# Patient Record
Sex: FEMALE | Race: WHITE | Employment: OTHER | URBAN - METROPOLITAN AREA
[De-identification: names, ages, dates, MRNs, and addresses within clinical notes are randomized per-mention and may not be internally consistent; named-entity substitution may affect disease eponyms.]

---

## 2017-03-23 ENCOUNTER — HOSPITAL ENCOUNTER (OUTPATIENT)
Dept: ONCOLOGY | Age: 67
Discharge: HOME | End: 2017-03-23

## 2017-10-27 RX ORDER — CEPHALEXIN 500 MG/1
500 CAPSULE ORAL 4 TIMES DAILY
COMMUNITY
End: 2018-06-11 | Stop reason: ALTCHOICE

## 2017-10-27 RX ORDER — LEVALBUTEROL TARTRATE 45 UG/1
1-2 AEROSOL, METERED ORAL EVERY 4 HOURS PRN
COMMUNITY

## 2017-10-27 RX ORDER — QUINIDINE GLUCONATE 324 MG
240 TABLET, EXTENDED RELEASE ORAL
COMMUNITY
End: 2017-12-13 | Stop reason: ALTCHOICE

## 2017-10-27 RX ORDER — FAMOTIDINE 20 MG/1
40 TABLET, FILM COATED ORAL 2 TIMES DAILY
COMMUNITY

## 2017-10-27 RX ORDER — KETOCONAZOLE 20 MG/G
CREAM TOPICAL DAILY
COMMUNITY

## 2017-10-27 RX ORDER — HYDROXYZINE HYDROCHLORIDE 25 MG/1
25 TABLET, FILM COATED ORAL 3 TIMES DAILY PRN
COMMUNITY
End: 2017-12-13 | Stop reason: ALTCHOICE

## 2017-10-27 RX ORDER — DOXYCYCLINE HYCLATE 100 MG
100 TABLET ORAL 2 TIMES DAILY
COMMUNITY

## 2017-10-27 RX ORDER — NYSTATIN 100000 U/G
CREAM TOPICAL 2 TIMES DAILY
COMMUNITY

## 2017-10-27 RX ORDER — MEROPENEM 1 G/1
1 INJECTION, POWDER, FOR SOLUTION INTRAVENOUS EVERY 8 HOURS
COMMUNITY
End: 2017-12-13 | Stop reason: ALTCHOICE

## 2017-11-15 ENCOUNTER — OFFICE VISIT (OUTPATIENT)
Dept: INFECTIOUS DISEASES | Age: 67
End: 2017-11-15
Payer: MEDICARE

## 2017-11-15 VITALS
HEART RATE: 84 BPM | TEMPERATURE: 97.1 F | SYSTOLIC BLOOD PRESSURE: 168 MMHG | WEIGHT: 293 LBS | DIASTOLIC BLOOD PRESSURE: 79 MMHG | BODY MASS INDEX: 45.99 KG/M2 | HEIGHT: 67 IN

## 2017-11-15 DIAGNOSIS — N39.0 E. COLI UTI: Primary | ICD-10-CM

## 2017-11-15 DIAGNOSIS — A31.8 MYCOBACTERIUM FORTUITUM INFECTION: ICD-10-CM

## 2017-11-15 DIAGNOSIS — A31.0 MYCOBACTERIAL DISEASE, PULMONARY (HCC): ICD-10-CM

## 2017-11-15 DIAGNOSIS — B96.20 E. COLI UTI: Primary | ICD-10-CM

## 2017-11-15 PROCEDURE — G8427 DOCREV CUR MEDS BY ELIG CLIN: HCPCS | Performed by: INTERNAL MEDICINE

## 2017-11-15 PROCEDURE — G8400 PT W/DXA NO RESULTS DOC: HCPCS | Performed by: INTERNAL MEDICINE

## 2017-11-15 PROCEDURE — 99214 OFFICE O/P EST MOD 30 MIN: CPT | Performed by: INTERNAL MEDICINE

## 2017-11-15 PROCEDURE — G8417 CALC BMI ABV UP PARAM F/U: HCPCS | Performed by: INTERNAL MEDICINE

## 2017-11-15 PROCEDURE — 4040F PNEUMOC VAC/ADMIN/RCVD: CPT | Performed by: INTERNAL MEDICINE

## 2017-11-15 PROCEDURE — G8484 FLU IMMUNIZE NO ADMIN: HCPCS | Performed by: INTERNAL MEDICINE

## 2017-11-15 PROCEDURE — 3014F SCREEN MAMMO DOC REV: CPT | Performed by: INTERNAL MEDICINE

## 2017-11-15 PROCEDURE — 1036F TOBACCO NON-USER: CPT | Performed by: INTERNAL MEDICINE

## 2017-11-15 PROCEDURE — 1123F ACP DISCUSS/DSCN MKR DOCD: CPT | Performed by: INTERNAL MEDICINE

## 2017-11-15 PROCEDURE — 1090F PRES/ABSN URINE INCON ASSESS: CPT | Performed by: INTERNAL MEDICINE

## 2017-11-15 PROCEDURE — 3017F COLORECTAL CA SCREEN DOC REV: CPT | Performed by: INTERNAL MEDICINE

## 2017-11-15 RX ORDER — CEFTRIAXONE 1 G/1
1 INJECTION, POWDER, FOR SOLUTION INTRAMUSCULAR; INTRAVENOUS EVERY 24 HOURS
Qty: 7 G | Refills: 0
Start: 2017-11-15 | End: 2017-11-22

## 2017-11-15 ASSESSMENT — ENCOUNTER SYMPTOMS
GASTROINTESTINAL NEGATIVE: 1
EYES NEGATIVE: 1
RESPIRATORY NEGATIVE: 1
ALLERGIC/IMMUNOLOGIC NEGATIVE: 1

## 2017-11-15 NOTE — LETTER
[facility] [address] [place] [address].  [address] [facility],5Th [address] [address]  Phone: [phone]  Fax: [phone]    PCP: Wesley Samano MD   CC providers:  No Recipients    November 15, 2017       Patient: Jhonny Santana   MR Number: [id number]   YOB: 1950   Date of Visit: 11/15/2017     Dear Williams Henry: Thank you for allowing me to see your patient Ms. Jhonny Santana for evaluation of UTI. Below are the relevant portions of my assessment and plan of care. No notes on file    If you have questions, please do not hesitate to call me. I look forward to following [place] along with you.     Sincerely,        Zaina Westfall MD

## 2017-11-15 NOTE — PROGRESS NOTES
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 2.  E. coli sensitive to ceftriaxone    Recommendations   · As explained to the patient, her symptoms are atypical, but because of persistence and lack of explanation of her frequency, we will treat her as a cystitis with ceftriaxone IM for 5-7 days. IV option in the infusion center is also acceptable. According to coverage. · She is due to have a CT scan of the chest early December . Follow-up on improvement of her chest lesions. Clinically she improved drastically Her pulmonary symptoms. · We will see after that CT scan is done. ·   1. E. coli UTI  cefTRIAXone (ROCEPHIN) 1 g injection   2. Mycobacterium fortuitum infection  cefTRIAXone (ROCEPHIN) 1 g injection   3. Mycobacterial disease, pulmonary (HCC)  cefTRIAXone (ROCEPHIN) 1 g injection       Return in about 1 month (around 12/15/2017), or with CT results - chest.      History of Present Illness:   Snehal Matos is a 79y.o.-year-old  female who presents with   Chief Complaint   Patient presents with    Follow-up     UTI and mycobacteria of the lungs   This is a lady who I follow for a Mycobacterium fortuitum lung infection. Has had bilateral lung disease with nodules up to 1.8 centimeter. Treated with meropenem about four-month finishing and off August, and doxycycline since April 2017, with complete intolerance to multiple other antibiotics that have been started through that course of therapy. She had symptoms of shortness of breath with asthma-like exacerbations and all the symptoms turned down and improved after her course of therapy.   She at this time remains on doxycycline by mouth and is due to have a CT scan 01/21/2012     01/03/2012    LYMPHOPCT 20 10/19/2016    LYMPHOPCT 18 11/13/2014    MONOPCT 8 10/19/2016    MONOPCT 4 11/13/2014     BMP:   Lab Results   Component Value Date     10/20/2016     10/19/2016    K 4.1 10/20/2016    K 4.4 10/19/2016     10/20/2016     10/19/2016    CO2 21 10/20/2016    CO2 25 10/19/2016    BUN 11 10/20/2016    BUN 15 10/19/2016    CREATININE 0.79 10/20/2016    CREATININE 0.94 10/19/2016    MG 2.0 10/20/2016    MG 1.9 01/21/2012     Hepatic Function Panel:   Lab Results   Component Value Date    PROT 7.4 06/04/2015    ALT 18 10/20/2016    AST 18 10/20/2016     No results found for: RPR  No results found for: HIV  No results found for: [facility]  Lab Results   Component Value Date    MUCUS NOT REPORTED 10/19/2016    RBC 3.93 10/20/2016    RBC 3.15 01/21/2012    TRICHOMONAS NOT REPORTED 10/19/2016    WBC 6.3 10/20/2016    YEAST NOT REPORTED 10/19/2016    TURBIDITY CLEAR 10/19/2016     Lab Results   Component Value Date    CREATININE 0.79 10/20/2016    GLUCOSE 106 10/20/2016    GLUCOSE 117 01/21/2012     Thank you for allowing us to participate in the care of this patient. Please call with questions. Guanaco Michaels MD  - Office: [phone]    Please note that this chart was generated using voice recognition Dragon dictation software. Although every effort was made to ensure the accuracy of this automated transcription, some errors in transcription may have occurred.

## 2017-11-17 ENCOUNTER — HOSPITAL ENCOUNTER (OUTPATIENT)
Dept: ONCOLOGY | Age: 67
Discharge: HOME | End: 2017-11-17

## 2017-11-18 PROBLEM — A31.0 MYCOBACTERIAL DISEASE, PULMONARY (HCC): Status: ACTIVE | Noted: 2017-11-18

## 2017-11-18 PROBLEM — N39.0 E. COLI UTI: Status: ACTIVE | Noted: 2017-11-18

## 2017-11-18 PROBLEM — B96.20 E. COLI UTI: Status: ACTIVE | Noted: 2017-11-18

## 2017-11-18 PROBLEM — A31.8 MYCOBACTERIUM FORTUITUM INFECTION: Status: ACTIVE | Noted: 2017-11-18

## 2017-11-19 ENCOUNTER — HOSPITAL ENCOUNTER (OUTPATIENT)
Dept: ONCOLOGY | Age: 67
Discharge: HOME | End: 2017-11-19
Attending: INTERNAL MEDICINE | Admitting: INTERNAL MEDICINE
Payer: MEDICARE

## 2017-11-19 VITALS
HEART RATE: 82 BPM | DIASTOLIC BLOOD PRESSURE: 80 MMHG | OXYGEN SATURATION: 99 % | SYSTOLIC BLOOD PRESSURE: 169 MMHG | RESPIRATION RATE: 16 BRPM | TEMPERATURE: 97.7 F

## 2017-11-19 DIAGNOSIS — N39.0 E. COLI UTI: ICD-10-CM

## 2017-11-19 DIAGNOSIS — A31.0 MYCOBACTERIAL DISEASE, PULMONARY (HCC): ICD-10-CM

## 2017-11-19 DIAGNOSIS — N30.90 CYSTITIS: ICD-10-CM

## 2017-11-19 DIAGNOSIS — A31.8 MYCOBACTERIUM FORTUITUM INFECTION: ICD-10-CM

## 2017-11-19 DIAGNOSIS — B96.20 E. COLI UTI: ICD-10-CM

## 2017-11-19 PROCEDURE — 2500000003 HC RX 250 WO HCPCS: Performed by: STUDENT IN AN ORGANIZED HEALTH CARE EDUCATION/TRAINING PROGRAM

## 2017-11-19 PROCEDURE — 6360000002 HC RX W HCPCS: Performed by: STUDENT IN AN ORGANIZED HEALTH CARE EDUCATION/TRAINING PROGRAM

## 2017-11-19 PROCEDURE — 96372 THER/PROPH/DIAG INJ SC/IM: CPT

## 2017-11-19 RX ORDER — CEFTRIAXONE 1 G/1
1 INJECTION, POWDER, FOR SOLUTION INTRAMUSCULAR; INTRAVENOUS ONCE
Status: CANCELLED
Start: 2017-11-20 | End: 2017-11-20

## 2017-11-19 RX ORDER — CEFTRIAXONE 1 G/1
1 INJECTION, POWDER, FOR SOLUTION INTRAMUSCULAR; INTRAVENOUS ONCE
Status: DISCONTINUED | OUTPATIENT
Start: 2017-11-19 | End: 2017-11-19

## 2017-11-19 RX ADMIN — CEFTRIAXONE SODIUM 1 G: 1 INJECTION, POWDER, FOR SOLUTION INTRAMUSCULAR; INTRAVENOUS at 15:28

## 2017-11-23 PROBLEM — A31.9 MYCOBACTERIUM INFECTION: Status: ACTIVE | Noted: 2017-11-23

## 2017-12-05 ENCOUNTER — TELEPHONE (OUTPATIENT)
Dept: INFECTIOUS DISEASES | Age: 67
End: 2017-12-05

## 2017-12-06 ENCOUNTER — TELEPHONE (OUTPATIENT)
Dept: INFECTIOUS DISEASES | Age: 67
End: 2017-12-06

## 2017-12-06 DIAGNOSIS — B99.9 INFECTION: Primary | ICD-10-CM

## 2017-12-13 ENCOUNTER — OFFICE VISIT (OUTPATIENT)
Dept: INFECTIOUS DISEASES | Age: 67
End: 2017-12-13
Payer: MEDICARE

## 2017-12-13 VITALS
SYSTOLIC BLOOD PRESSURE: 175 MMHG | WEIGHT: 293 LBS | HEIGHT: 67 IN | BODY MASS INDEX: 45.99 KG/M2 | TEMPERATURE: 97.1 F | OXYGEN SATURATION: 99 % | DIASTOLIC BLOOD PRESSURE: 79 MMHG | HEART RATE: 76 BPM | RESPIRATION RATE: 14 BRPM

## 2017-12-13 DIAGNOSIS — A31.8 MYCOBACTERIUM FORTUITUM INFECTION: ICD-10-CM

## 2017-12-13 DIAGNOSIS — N30.00 ACUTE CYSTITIS WITHOUT HEMATURIA: ICD-10-CM

## 2017-12-13 DIAGNOSIS — A31.0 PULMONARY MYCOBACTERIAL INFECTION (HCC): Primary | ICD-10-CM

## 2017-12-13 PROCEDURE — 4040F PNEUMOC VAC/ADMIN/RCVD: CPT | Performed by: INTERNAL MEDICINE

## 2017-12-13 PROCEDURE — 1036F TOBACCO NON-USER: CPT | Performed by: INTERNAL MEDICINE

## 2017-12-13 PROCEDURE — G8427 DOCREV CUR MEDS BY ELIG CLIN: HCPCS | Performed by: INTERNAL MEDICINE

## 2017-12-13 PROCEDURE — 99214 OFFICE O/P EST MOD 30 MIN: CPT | Performed by: INTERNAL MEDICINE

## 2017-12-13 PROCEDURE — G8417 CALC BMI ABV UP PARAM F/U: HCPCS | Performed by: INTERNAL MEDICINE

## 2017-12-13 PROCEDURE — 3017F COLORECTAL CA SCREEN DOC REV: CPT | Performed by: INTERNAL MEDICINE

## 2017-12-13 PROCEDURE — G8400 PT W/DXA NO RESULTS DOC: HCPCS | Performed by: INTERNAL MEDICINE

## 2017-12-13 PROCEDURE — G8484 FLU IMMUNIZE NO ADMIN: HCPCS | Performed by: INTERNAL MEDICINE

## 2017-12-13 PROCEDURE — 3014F SCREEN MAMMO DOC REV: CPT | Performed by: INTERNAL MEDICINE

## 2017-12-13 PROCEDURE — 1090F PRES/ABSN URINE INCON ASSESS: CPT | Performed by: INTERNAL MEDICINE

## 2017-12-13 PROCEDURE — 1123F ACP DISCUSS/DSCN MKR DOCD: CPT | Performed by: INTERNAL MEDICINE

## 2017-12-13 RX ORDER — LEVALBUTEROL INHALATION SOLUTION 0.31 MG/3ML
1 SOLUTION RESPIRATORY (INHALATION) EVERY 4 HOURS PRN
COMMUNITY

## 2017-12-13 RX ORDER — DOXYCYCLINE HYCLATE 100 MG
100 TABLET ORAL 2 TIMES DAILY
Qty: 60 TABLET | Refills: 1 | Status: SHIPPED | OUTPATIENT
Start: 2017-12-13 | End: 2018-03-06 | Stop reason: SDUPTHER

## 2017-12-13 RX ORDER — AZELASTINE 1 MG/ML
1 SPRAY, METERED NASAL 2 TIMES DAILY
COMMUNITY

## 2017-12-13 NOTE — PATIENT INSTRUCTIONS
Patient is scheduled for CT scan at [facility] on 6/9/18 at 1:30pm. Order faxed to [phone].

## 2017-12-13 NOTE — PROGRESS NOTES
[facility] - Initial Consult Note  Today's Date and Time: 12/13/2017, 4:33 PM    Impression :   ·     Recommendations   ·   1. Pulmonary mycobacterial infection ([address]     2. Mycobacterium fortuitum infection         No Follow-up on file. History of Present Illness:   Riaz Sanders is a 79y.o.-year-old {Race/ethnicity:[id number]} female who presents with   Chief Complaint   Patient presents with    Other     mycobacteria fortuitum       I have personally reviewed the past medical history, past surgical history, medications, social history, and family history, and I have updated the database accordingly. 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    Congenital malformation of bladder and urethra     Fracture 2001    2 vertebrate in mid back    Hypertension     Macular pucker     Sensitivity to sunlight     Extreme    Sjogren's syndrome (HCC)     Sjogren's syndrome ([facility] 2015    SVT [facility]supra[facility]ricular tachycardia) ([facility])     Vertigo     Vertigo     Whiplash 1998    WPW (Castro-Parkinson-White syndrome)        Past Surgical  History:     Past Surgical History:   Procedure Laterality Date    ABLATION OF DYSRHYTHMIC FOCUS      CHOLECYSTECTOMY      HYSTERECTOMY      JOINT REPLACEMENT      SINUS SURGERY      TONSILLECTOMY      TOTAL KNEE ARTHROPLASTY Bilateral     TUBAL LIGATION      URETHRAL STRICTURE DILATATION      and repair       Medications:     Current Outpatient Prescriptions:     levalbuterol (XOPENEX) 0.31 MG/3ML nebulization, Take 1 ampule by nebulization every 4 hours as needed for Wheezing, Disp: , Rfl:     azelastine (ASTELIN) 0.1 % nasal spray, 1 spray by Nasal route 2 times daily Use in each nostril as directed, Disp: , Rfl:     ketoconazole (NIZORAL) 2 % cream, Apply topically daily Apply topically daily. , Disp: , Rfl:    Flonase [fluticasone propionate]; Prednisone; and Reglan [metoclopramide]     Review of Systems:   Review of Systems    Physical Examination :   Blood pressure (!) 175/79, pulse 76, temperature 97.1 °F (36.2 °C), resp. rate 14, height 5' 7\" (1.702 m), weight (!) 358 lb 12.8 oz (162.8 kg), SpO2 99 %. Physical Exam      Medical Decision Making:   I have independently reviewed/ordered the following labs:    CBC with Differential:   Lab Results   Component Value Date    WBC 6.3 10/20/2016    WBC 7.0 10/19/2016    HGB 10.3 10/20/2016    HGB 10.6 10/19/2016    HCT 32.1 10/20/2016    HCT 33.8 10/19/2016     10/20/2016     10/19/2016     01/21/2012     01/03/2012    LYMPHOPCT 20 10/19/2016    LYMPHOPCT 18 11/13/2014    MONOPCT 8 10/19/2016    MONOPCT 4 11/13/2014     BMP:   Lab Results   Component Value Date     10/20/2016     10/19/2016    K 4.1 10/20/2016    K 4.4 10/19/2016     10/20/2016     10/19/2016    CO2 21 10/20/2016    CO2 25 10/19/2016    BUN 11 10/20/2016    BUN 15 10/19/2016    CREATININE 0.79 10/20/2016    CREATININE 0.94 10/19/2016    MG 2.0 10/20/2016    MG 1.9 01/21/2012     Hepatic Function Panel:   Lab Results   Component Value Date    PROT 7.4 06/04/2015    ALT 18 10/20/2016    AST 18 10/20/2016     No results found for: RPR  No results found for: HIV  No results found for: [facility]  Lab Results   Component Value Date    MUCUS NOT REPORTED 10/19/2016    RBC 3.93 10/20/2016    RBC 3.15 01/21/2012    TRICHOMONAS NOT REPORTED 10/19/2016    WBC 6.3 10/20/2016    YEAST NOT REPORTED 10/19/2016    TURBIDITY CLEAR 10/19/2016     Lab Results   Component Value Date    CREATININE 0.79 10/20/2016    GLUCOSE 106 10/20/2016    GLUCOSE 117 01/21/2012     Thank you for allowing us to participate in the care of this patient. Please call with questions.       Tejas Burk MD  - Office: [phone]    Please note that this chart was generated using voice recognition

## 2017-12-13 NOTE — LETTER
16 mm right pretracheal area. This slowly decreased 30 mm on noncontrast CT of April 2017, 12 mm June 2017 . CT chest with contrast, and 8 mm on the current CT, noncontrast, without abnormally enlarged lymph nodes and a decrease in number of the lymph nodes in general.  The patient is pretty happy with results. Continue to be on the doxycycline at this time. I have personally reviewed the past medical history, past surgical history, medications, social history, and family history, and I have updated the database accordingly. 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    Congenital malformation of bladder and urethra     Fracture 2001    2 vertebrate in mid back    Hypertension     Macular pucker     Sensitivity to sunlight     Extreme    Sjogren's syndrome (HCC)     Sjogren's syndrome ([address] 2015    SVT (supraventricular tachycardia) (HCC)     Vertigo     Vertigo     Whiplash 1998    WPW (Castro-Parkinson-White syndrome)        Past Surgical  History:     Past Surgical History:   Procedure Laterality Date    ABLATION OF DYSRHYTHMIC FOCUS      CHOLECYSTECTOMY      HYSTERECTOMY      JOINT REPLACEMENT      SINUS SURGERY      TONSILLECTOMY      TOTAL KNEE ARTHROPLASTY Bilateral     TUBAL LIGATION      URETHRAL STRICTURE DILATATION      and repair       Medications:     Current Outpatient Prescriptions:     levalbuterol (XOPENEX) 0.31 MG/3ML nebulization, Take 1 ampule by nebulization every 4 hours as needed for Wheezing, Disp: , Rfl:     azelastine (ASTELIN) 0.1 % nasal spray, 1 spray by Nasal route 2 times daily Use in each nostril as directed, Disp: , Rfl:     doxycycline hyclate (VIBRA-TABS) 100 MG tablet, Take 1 tablet by mouth 2 times daily, Disp: 60 tablet, Rfl: 1    ketoconazole (NIZORAL) 2 % cream, Apply topically daily Apply topically daily. , Disp: , Rfl:

## 2017-12-20 ASSESSMENT — ENCOUNTER SYMPTOMS
EYES NEGATIVE: 1
ALLERGIC/IMMUNOLOGIC NEGATIVE: 1
GASTROINTESTINAL NEGATIVE: 1
RESPIRATORY NEGATIVE: 1

## 2017-12-21 NOTE — PROGRESS NOTES
difficult because of scarring, but the patient continued afterwards having a frequency that continues until today. A urine culture showed Klebsiella and E. coli, and repeat cultures showed E. coli alone. Klebsiella was sensitive to Cipro, but E. coli was sensitive to Macrobid as only oral therapy. The E. coli is also sensitive to ceftriaxone. The patient failed to improve after 2 courses of Macrobid and on short course of Cipro to which she developed tendinitis. The count of E. coli was 10-50,000, and she was referred by the PA to see me for opinion in antibiotic need an choice. The patient has no fever or chills, no flank pain. She has burning upon urination, but it seems to be mostly from the skin itself that is indicated. She has been diagnosed with bacterial vaginosis recently by her primary doctor, and he is on day 2 of Flagyl. The indication is felt to be related to the discharge from the bacterial vaginosis. Visit of 12/13/17  She had a course of Rocephin, her UTI symptoms resolved completely. In the meantime, she had a repeat CT scan of the chest December 2017, which I compared to prior CTs and discuss it over the phone with the radiologist in [facility].  She started in December 2017 with a 17 mm in the hilar area along with a 16 mm right pretracheal area. This slowly decreased 30 mm on noncontrast CT of April 2017, 12 mm June 2017 . CT chest with contrast, and 8 mm on the current CT, noncontrast, without abnormally enlarged lymph nodes and a decrease in number of the lymph nodes in general.  The patient is pretty happy with results. Continue to be on the doxycycline at this time. I have personally reviewed the past medical history, past surgical history, medications, social history, and family history, and I have updated the database accordingly.   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    Congenital malformation of bladder and urethra     Fracture 2001    2 vertebrate in mid back    Hypertension     Macular pucker     Sensitivity to sunlight     Extreme    Sjogren's syndrome (HCC)     Sjogren's syndrome ([address] 2015    SVT (supraventricular tachycardia) (HCC)     Vertigo     Vertigo     Whiplash 1998    WPW (Castro-Parkinson-White syndrome)        Past Surgical  History:     Past Surgical History:   Procedure Laterality Date    ABLATION OF DYSRHYTHMIC FOCUS      CHOLECYSTECTOMY      HYSTERECTOMY      JOINT REPLACEMENT      SINUS SURGERY      TONSILLECTOMY      TOTAL KNEE ARTHROPLASTY Bilateral     TUBAL LIGATION      URETHRAL STRICTURE DILATATION      and repair       Medications:     Current Outpatient Prescriptions:     levalbuterol (Billie Dirk) 0.31 MG/3ML nebulization, Take 1 ampule by nebulization every 4 hours as needed for Wheezing, Disp: , Rfl:     azelastine (ASTELIN) 0.1 % nasal spray, 1 spray by Nasal route 2 times daily Use in each nostril as directed, Disp: , Rfl:     doxycycline hyclate (VIBRA-TABS) 100 MG tablet, Take 1 tablet by mouth 2 times daily, Disp: 60 tablet, Rfl: 1    ketoconazole (NIZORAL) 2 % cream, Apply topically daily Apply topically daily. , Disp: , Rfl:     verapamil (CALAN SR) 180 MG extended release tablet, Take 180 mg by mouth nightly, Disp: , Rfl:     doxycycline hyclate (VIBRA-TABS) 100 MG tablet, Take 100 mg by mouth 2 times daily, Disp: , Rfl:     cephALEXin (KEFLEX) 500 MG capsule, Take 500 mg by mouth 4 times daily, Disp: , Rfl:     mupirocin (BACTROBAN) 2 % ointment, Apply topically 3 times daily Apply topically 3 times daily. , Disp: , Rfl:     nystatin (MYCOSTATIN) 839195 UNIT/GM cream, Apply topically 2 times daily Apply topically 2 times daily. , Disp: , Rfl:     famotidine (PEPCID) 20 MG tablet, Take 20 mg by mouth 2 times daily, Disp: , Rfl:     levalbuterol (XOPENEX HFA) 45 MCG/ACT inhaler, Inhale 1-2 puffs

## 2018-03-06 DIAGNOSIS — A31.8 MYCOBACTERIUM FORTUITUM INFECTION: ICD-10-CM

## 2018-03-06 DIAGNOSIS — A31.0 PULMONARY MYCOBACTERIAL INFECTION (HCC): ICD-10-CM

## 2018-03-07 RX ORDER — DOXYCYCLINE HYCLATE 100 MG
TABLET ORAL
Qty: 60 TABLET | Refills: 0 | Status: SHIPPED | OUTPATIENT
Start: 2018-03-07 | End: 2018-04-15 | Stop reason: SDUPTHER

## 2018-04-15 DIAGNOSIS — A31.0 PULMONARY MYCOBACTERIAL INFECTION (HCC): ICD-10-CM

## 2018-04-15 DIAGNOSIS — A31.8 MYCOBACTERIUM FORTUITUM INFECTION: ICD-10-CM

## 2018-04-17 RX ORDER — DOXYCYCLINE HYCLATE 100 MG
TABLET ORAL
Qty: 60 TABLET | Refills: 0 | Status: SHIPPED | OUTPATIENT
Start: 2018-04-17 | End: 2018-05-18 | Stop reason: SDUPTHER

## 2018-05-18 DIAGNOSIS — A31.8 MYCOBACTERIUM FORTUITUM INFECTION: ICD-10-CM

## 2018-05-18 DIAGNOSIS — A31.0 PULMONARY MYCOBACTERIAL INFECTION (HCC): ICD-10-CM

## 2018-05-18 RX ORDER — DOXYCYCLINE HYCLATE 100 MG
TABLET ORAL
Qty: 60 TABLET | Refills: 1 | Status: SHIPPED | OUTPATIENT
Start: 2018-05-18

## 2018-06-11 ENCOUNTER — OFFICE VISIT (OUTPATIENT)
Dept: INFECTIOUS DISEASES | Age: 68
End: 2018-06-11
Payer: MEDICARE

## 2018-06-11 VITALS
TEMPERATURE: 97.7 F | SYSTOLIC BLOOD PRESSURE: 142 MMHG | HEART RATE: 85 BPM | BODY MASS INDEX: 45.99 KG/M2 | HEIGHT: 67 IN | DIASTOLIC BLOOD PRESSURE: 81 MMHG | WEIGHT: 293 LBS

## 2018-06-11 DIAGNOSIS — A31.0 MYCOBACTERIAL DISEASE, PULMONARY (HCC): Primary | ICD-10-CM

## 2018-06-11 PROCEDURE — G8400 PT W/DXA NO RESULTS DOC: HCPCS | Performed by: INTERNAL MEDICINE

## 2018-06-11 PROCEDURE — 1036F TOBACCO NON-USER: CPT | Performed by: INTERNAL MEDICINE

## 2018-06-11 PROCEDURE — G8417 CALC BMI ABV UP PARAM F/U: HCPCS | Performed by: INTERNAL MEDICINE

## 2018-06-11 PROCEDURE — 3017F COLORECTAL CA SCREEN DOC REV: CPT | Performed by: INTERNAL MEDICINE

## 2018-06-11 PROCEDURE — 1123F ACP DISCUSS/DSCN MKR DOCD: CPT | Performed by: INTERNAL MEDICINE

## 2018-06-11 PROCEDURE — 1090F PRES/ABSN URINE INCON ASSESS: CPT | Performed by: INTERNAL MEDICINE

## 2018-06-11 PROCEDURE — 4040F PNEUMOC VAC/ADMIN/RCVD: CPT | Performed by: INTERNAL MEDICINE

## 2018-06-11 PROCEDURE — G8427 DOCREV CUR MEDS BY ELIG CLIN: HCPCS | Performed by: INTERNAL MEDICINE

## 2018-06-11 PROCEDURE — 99214 OFFICE O/P EST MOD 30 MIN: CPT | Performed by: INTERNAL MEDICINE

## 2018-06-11 RX ORDER — FLUCONAZOLE 100 MG/1
150 TABLET ORAL DAILY
Qty: 3 TABLET | Refills: 1 | Status: SHIPPED | OUTPATIENT
Start: 2018-06-11 | End: 2018-06-13

## 2018-06-11 RX ORDER — DOXYCYCLINE HYCLATE 100 MG
100 TABLET ORAL 2 TIMES DAILY
Qty: 60 TABLET | Refills: 1 | Status: SHIPPED | OUTPATIENT
Start: 2018-06-11 | End: 2018-07-11

## 2018-06-11 ASSESSMENT — ENCOUNTER SYMPTOMS
RESPIRATORY NEGATIVE: 1
ALLERGIC/IMMUNOLOGIC NEGATIVE: 1
EYES NEGATIVE: 1
GASTROINTESTINAL NEGATIVE: 1

## 2018-06-29 ASSESSMENT — ENCOUNTER SYMPTOMS
ABDOMINAL PAIN: 0
COUGH: 0
SHORTNESS OF BREATH: 0
DIARRHEA: 0

## 2018-07-10 ENCOUNTER — HOSPITAL ENCOUNTER (OUTPATIENT)
Age: 68
Setting detail: OUTPATIENT SURGERY
Discharge: HOME | End: 2018-07-10
Attending: INTERNAL MEDICINE | Admitting: INTERNAL MEDICINE
Payer: MEDICARE

## 2018-07-10 ENCOUNTER — ANESTHESIA EVENT (OUTPATIENT)
Dept: ENDOSCOPY | Age: 68
End: 2018-07-10
Payer: MEDICARE

## 2018-07-10 ENCOUNTER — ANESTHESIA (OUTPATIENT)
Dept: ENDOSCOPY | Age: 68
End: 2018-07-10
Payer: MEDICARE

## 2018-07-10 VITALS
OXYGEN SATURATION: 98 % | WEIGHT: 293 LBS | HEIGHT: 67 IN | RESPIRATION RATE: 15 BRPM | SYSTOLIC BLOOD PRESSURE: 119 MMHG | BODY MASS INDEX: 45.99 KG/M2 | DIASTOLIC BLOOD PRESSURE: 65 MMHG | HEART RATE: 75 BPM | TEMPERATURE: 97.8 F

## 2018-07-10 VITALS
DIASTOLIC BLOOD PRESSURE: 90 MMHG | SYSTOLIC BLOOD PRESSURE: 140 MMHG | OXYGEN SATURATION: 96 % | RESPIRATION RATE: 22 BRPM

## 2018-07-10 PROCEDURE — 2580000003 HC RX 258: Performed by: INTERNAL MEDICINE

## 2018-07-10 PROCEDURE — 2580000003 HC RX 258: Performed by: NURSE ANESTHETIST, CERTIFIED REGISTERED

## 2018-07-10 PROCEDURE — 7100000011 HC PHASE II RECOVERY - ADDTL 15 MIN: Performed by: INTERNAL MEDICINE

## 2018-07-10 PROCEDURE — 3609012400 HC EGD TRANSORAL BIOPSY SINGLE/MULTIPLE: Performed by: INTERNAL MEDICINE

## 2018-07-10 PROCEDURE — 88305 TISSUE EXAM BY PATHOLOGIST: CPT

## 2018-07-10 PROCEDURE — 7100000010 HC PHASE II RECOVERY - FIRST 15 MIN: Performed by: INTERNAL MEDICINE

## 2018-07-10 PROCEDURE — 3700000000 HC ANESTHESIA ATTENDED CARE: Performed by: INTERNAL MEDICINE

## 2018-07-10 PROCEDURE — 2500000003 HC RX 250 WO HCPCS: Performed by: NURSE ANESTHETIST, CERTIFIED REGISTERED

## 2018-07-10 PROCEDURE — 76937 US GUIDE VASCULAR ACCESS: CPT

## 2018-07-10 PROCEDURE — 6360000002 HC RX W HCPCS: Performed by: NURSE ANESTHETIST, CERTIFIED REGISTERED

## 2018-07-10 RX ORDER — SODIUM CHLORIDE 9 MG/ML
INJECTION, SOLUTION INTRAVENOUS CONTINUOUS
Status: DISCONTINUED | OUTPATIENT
Start: 2018-07-10 | End: 2018-07-10 | Stop reason: HOSPADM

## 2018-07-10 RX ORDER — PROPOFOL 10 MG/ML
INJECTION, EMULSION INTRAVENOUS PRN
Status: DISCONTINUED | OUTPATIENT
Start: 2018-07-10 | End: 2018-07-10 | Stop reason: SDUPTHER

## 2018-07-10 RX ORDER — SODIUM CHLORIDE 9 MG/ML
INJECTION, SOLUTION INTRAVENOUS CONTINUOUS PRN
Status: DISCONTINUED | OUTPATIENT
Start: 2018-07-10 | End: 2018-07-10 | Stop reason: SDUPTHER

## 2018-07-10 RX ORDER — LIDOCAINE HYDROCHLORIDE 10 MG/ML
INJECTION, SOLUTION INFILTRATION; PERINEURAL PRN
Status: DISCONTINUED | OUTPATIENT
Start: 2018-07-10 | End: 2018-07-10 | Stop reason: SDUPTHER

## 2018-07-10 RX ADMIN — LIDOCAINE HYDROCHLORIDE 30 MG: 10 INJECTION, SOLUTION INFILTRATION; PERINEURAL at 10:27

## 2018-07-10 RX ADMIN — PROPOFOL 150 MG: 10 INJECTION, EMULSION INTRAVENOUS at 10:27

## 2018-07-10 RX ADMIN — SODIUM CHLORIDE: 9 INJECTION, SOLUTION INTRAVENOUS at 10:23

## 2018-07-10 RX ADMIN — PROPOFOL 50 MG: 10 INJECTION, EMULSION INTRAVENOUS at 10:30

## 2018-07-10 RX ADMIN — PROPOFOL 50 MG: 10 INJECTION, EMULSION INTRAVENOUS at 10:33

## 2018-07-10 RX ADMIN — SODIUM CHLORIDE: 9 INJECTION, SOLUTION INTRAVENOUS at 09:16

## 2018-07-10 ASSESSMENT — PAIN SCALES - GENERAL
PAINLEVEL_OUTOF10: 2

## 2018-07-10 ASSESSMENT — PAIN DESCRIPTION - PAIN TYPE
TYPE: ACUTE PAIN

## 2018-07-10 ASSESSMENT — ENCOUNTER SYMPTOMS
SHORTNESS OF BREATH: 0
STRIDOR: 0

## 2018-07-10 ASSESSMENT — PAIN DESCRIPTION - LOCATION
LOCATION: ABDOMEN

## 2018-07-10 NOTE — H&P
History and Physical    Pt Name: Loman Lesch  MRN: [id number]  YOB: 1950  Date of evaluation: 7/10/2018  Primary Care Physician: Eugene Salmeron MD  Patient evaluated at the request of  Dr. Lamar Abreu    Reason for evaluation:  Abdominal pain   SUBJECTIVE:   History of Chief Complaint:      Lm Monge is a 79 y.o. female     Who is scheduled to have    Her    EGD today , her last EGD was  3 yrs ago , hx of right lower quad abdominal pain that moves to the central abdominal region for the last month  . Hx of feeling and looking bloated , denies GI bleeding. 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 Past Surgical History   has a past surgical history that includes Tonsillectomy; Tubal ligation; Hysterectomy; joint replacement; ablation of dysrhythmic focus; Cholecystectomy; sinus surgery; Total knee arthroplasty (Bilateral); and Urethra dilation. Medications   Scheduled Meds:  Continuous Infusions:  PRN Meds:. Allergies  is allergic to adalat [nifedipine]; codeine; atrovent nasal spray [ipratropium]; azithromycin; celebrex [celecoxib]; ciprofloxacin; dymista [azelastine-fluticasone]; iodinated diagnostic agents; levaquin [levofloxacin]; lisinopril; losartan; minocycline; other; percocet [oxycodone-acetaminophen]; singulair [montelukast]; tape [adhesive tape]; ultram [tramadol hcl]; azelastine; benzodiazepines; diltiazem; flonase [fluticasone propionate]; prednisone; and reglan [metoclopramide].     Family UTI    Cystitis    Mycobacterium infection               IMPRESSIONS:   1. Abdominal pain   2.  does not have any pertinent problems on file.     [facility]  Electronically signed 7/10/2018 at 8:22 AM       Scheduled for:   EGD

## 2018-07-10 NOTE — ANESTHESIA PRE PROCEDURE
for Wheezing    Historical Provider, MD       Current medications:    Current Facility-Administered Medications   Medication Dose Route Frequency Provider Last Rate Last Dose    0.9 % sodium chloride infusion   Intravenous Continuous Deana Lemus DO           Allergies: Allergies   Allergen Reactions    Adalat [Nifedipine] Anaphylaxis    Codeine Anaphylaxis    Atrovent Nasal Spray [Ipratropium]     Azithromycin Swelling    Celebrex [Celecoxib] Nausea Only    Ciprofloxacin      Tendonitis even with low dose    Dymista [Azelastine-Fluticasone]     Flecainide Other (See Comments)    Iodinated Diagnostic Agents      Tolerated ivp dye  w solumedrol and benadryl    Levaquin [Levofloxacin] Other (See Comments)     Tendon problems.  Lisinopril Swelling and Other (See Comments)     angioedema    Losartan Other (See Comments)     Hx angioedema with lisinopril    Minocycline Other (See Comments)     Shakes    Other      Preservatives in eye drops.     Percocet [Oxycodone-Acetaminophen]     Singulair [Montelukast] Other (See Comments)     Patient states she becomes shaky and lightheaded    Tape Warren Sauger Tape]     Ultram [Tramadol Hcl] Hives    Charyl Halifax D-S] Other (See Comments)    Azelastine Nausea And Vomiting    Benzodiazepines Anxiety    Diltiazem Palpitations    Flonase [Fluticasone Propionate] Palpitations    Prednisone Palpitations     Tolerated solumedrol    Reglan [Metoclopramide] Anxiety       Problem List:    Patient Active Problem List   Diagnosis Code    Dizziness R42    Benign essential HTN I10    Morbid obesity ([facility] E66.01    Mild intermittent asthma without complication M47.36    Near syncope R55    Acute cystitis without hematuria N30.00    Acute cystitis without hematuria N30.00    Mycobacterium fortuitum infection A31.8    Mycobacterial disease, pulmonary (HCC) A31.0    E. coli UTI N39.0, B96.20    Cystitis N30.90    Mycobacterium infection A31.9       Past 81.9 10/20/2016    RDW 17.9 10/20/2016     10/20/2016     01/21/2012       CMP:   Lab Results   Component Value Date     10/20/2016    K 4.1 10/20/2016     10/20/2016    CO2 21 10/20/2016    BUN 11 10/20/2016    CREATININE 0.79 10/20/2016    GFRAA >60 10/20/2016    LABGLOM >60 10/20/2016    GLUCOSE 106 10/20/2016    GLUCOSE 117 01/21/2012    PROT 7.4 06/04/2015    CALCIUM 8.8 10/20/2016    AST 18 10/20/2016    ALT 18 10/20/2016       POC Tests: No results for input(s): POCGLU, POCNA, POCK, POCCL, POCBUN, POCHEMO, POCHCT in the last 72 hours. Coags: No results found for: PROTIME, INR, APTT    HCG (If Applicable): No results found for: PREGTESTUR, PREGSERUM, HCG, HCGQUANT     ABGs: No results found for: PHART, PO2ART, XCO8DDI, BVE9XBU, BEART, R0LLTXLF     Type & Screen (If Applicable):  No results found for: LABABO, LABRH    Anesthesia Evaluation   no history of anesthetic complications:   Airway: Mallampati: II     Neck ROM: full  Mouth opening: > = 3 FB Dental:          Pulmonary:   (+) asthma (srtress induced asthma): exercise-induced asthma,     (-) shortness of breath, sleep apnea and stridor                           Cardiovascular:    (+) hypertension:,     (-) pacemaker, CABG/stent,  angina and  CHF        Rate: normal                 ROS comment: S/p ablation for WPW-doing well     Neuro/Psych:      (-) seizures, TIA and CVA           GI/Hepatic/Renal:   (+) morbid obesity     (-) GERD, liver disease and no renal disease       Endo/Other:        (-) diabetes mellitus, hypothyroidism, hyperthyroidism, blood dyscrasia                ROS comment: sjogrens syndrome Abdominal:       Abdomen: soft. Vascular:                                        Anesthesia Plan      MAC     ASA 3       Induction: intravenous. Anesthetic plan and risks discussed with patient.                       Willie Edge MD   7/10/2018

## 2018-07-11 LAB — SURGICAL PATHOLOGY REPORT: NORMAL

## 2019-03-14 ENCOUNTER — HOSPITAL ENCOUNTER (OUTPATIENT)
Age: 69
Setting detail: SPECIMEN
Discharge: HOME | End: 2019-03-14
Payer: MEDICARE

## 2019-03-14 PROCEDURE — 87070 CULTURE OTHR SPECIMN AEROBIC: CPT

## 2019-03-14 PROCEDURE — 87205 SMEAR GRAM STAIN: CPT

## 2019-03-16 LAB
CULTURE: NORMAL
DIRECT EXAM: NORMAL
DIRECT EXAM: NORMAL
Lab: NORMAL
SPECIMEN DESCRIPTION: NORMAL

## 2019-06-03 DIAGNOSIS — A31.0 MYCOBACTERIAL DISEASE, PULMONARY (HCC): ICD-10-CM

## 2019-09-09 ENCOUNTER — OFFICE VISIT (OUTPATIENT)
Dept: INFECTIOUS DISEASES | Age: 69
End: 2019-09-09
Payer: MEDICARE

## 2019-09-09 VITALS
TEMPERATURE: 96.5 F | HEART RATE: 81 BPM | WEIGHT: 293 LBS | BODY MASS INDEX: 45.99 KG/M2 | SYSTOLIC BLOOD PRESSURE: 160 MMHG | DIASTOLIC BLOOD PRESSURE: 85 MMHG | HEIGHT: 67 IN

## 2019-09-09 DIAGNOSIS — J40 BRONCHITIS: Primary | ICD-10-CM

## 2019-09-09 DIAGNOSIS — Z86.19 HISTORY OF MYCOBACTERIAL INFECTION: ICD-10-CM

## 2019-09-09 PROCEDURE — G8427 DOCREV CUR MEDS BY ELIG CLIN: HCPCS | Performed by: INTERNAL MEDICINE

## 2019-09-09 PROCEDURE — 1036F TOBACCO NON-USER: CPT | Performed by: INTERNAL MEDICINE

## 2019-09-09 PROCEDURE — G8417 CALC BMI ABV UP PARAM F/U: HCPCS | Performed by: INTERNAL MEDICINE

## 2019-09-09 PROCEDURE — 4040F PNEUMOC VAC/ADMIN/RCVD: CPT | Performed by: INTERNAL MEDICINE

## 2019-09-09 PROCEDURE — 1090F PRES/ABSN URINE INCON ASSESS: CPT | Performed by: INTERNAL MEDICINE

## 2019-09-09 PROCEDURE — 99214 OFFICE O/P EST MOD 30 MIN: CPT | Performed by: INTERNAL MEDICINE

## 2019-09-09 PROCEDURE — 3017F COLORECTAL CA SCREEN DOC REV: CPT | Performed by: INTERNAL MEDICINE

## 2019-09-09 PROCEDURE — G8400 PT W/DXA NO RESULTS DOC: HCPCS | Performed by: INTERNAL MEDICINE

## 2019-09-09 PROCEDURE — 1123F ACP DISCUSS/DSCN MKR DOCD: CPT | Performed by: INTERNAL MEDICINE

## 2019-09-09 RX ORDER — AMOXICILLIN AND CLAVULANATE POTASSIUM 875; 125 MG/1; MG/1
1 TABLET, FILM COATED ORAL 2 TIMES DAILY
Qty: 20 TABLET | Refills: 0 | Status: SHIPPED | OUTPATIENT
Start: 2019-09-09 | End: 2019-09-19

## 2019-09-09 ASSESSMENT — ENCOUNTER SYMPTOMS
GASTROINTESTINAL NEGATIVE: 1
ABDOMINAL PAIN: 0
EYES NEGATIVE: 1
SHORTNESS OF BREATH: 0
DIARRHEA: 0
ALLERGIC/IMMUNOLOGIC NEGATIVE: 1

## 2019-09-09 NOTE — PROGRESS NOTES
[facility] - Initial Consult Note  Today's Date and Time: 9/9/19    Impression :   · Mycobacterium fortuitum bilateral pulmonary infection, diagnosis on the peritracheal lymph node biopsy  · Post meropenem for 4 months- end of August 2017,   · on doxycycline since April 2017 till august 2018  · Cystitis:. E. coli sensitive to ceftriaxone  · Fungal vaginitis, related to antibiotic intake  · 9/9/2019 bronchitis, cough ongoing for 2-month, worse last 2 weeks  · ? reflux    Recommendations   · Took Doxycycline until the end of August 2018 - none since. · get a CT scan of the chest without contrast.  Follow on mycobacterial possible relapse  · We'll see the patient about a 1 year  · Of Augmentin for  bronchitis,    Diagnosis:  · Bronchitis  · Hx Mycobacterial infection      Return in about 1 year (around 9/9/2020). History of Present Illness:   Luc Hawk is a 76y.o.-year-old  female who presents with   Chief Complaint   Patient presents with    Frequent Infections     Follow Up   Visit of 11/15/17  This is a lady who I follow for a Mycobacterium fortuitum lung infection. Has had bilateral lung disease with nodules up to 1.8 centimeter. Treated with meropenem about four-month finishing and off August, and doxycycline since April 2017, with complete intolerance to multiple other antibiotics that have been started through that course of therapy. She had symptoms of shortness of breath with asthma-like exacerbations and all the symptoms turned down and improved after her course of therapy. She at this time remains on doxycycline by mouth and is due to have a CT scan of the chest early December 2017. Last CT was in June 2017 and had shown a drop in the size of the nodules from 1.8 cm to 0.8 cm. In the interim, in October 5.   She had a usual urethral dilation, thought was a little difficult because of scarring, but the patient continued afterwards having a frequency that continues until today. A urine culture showed Klebsiella and E. coli, and repeat cultures showed E. coli alone. Klebsiella was sensitive to Cipro, but E. coli was sensitive to Macrobid as only oral therapy. The E. coli is also sensitive to ceftriaxone. The patient failed to improve after 2 courses of Macrobid and on short course of Cipro to which she developed tendinitis. The count of E. coli was 10-50,000, and she was referred by the PA to see me for opinion in antibiotic need an choice. The patient has no fever or chills, no flank pain. She has burning upon urination, but it seems to be mostly from the skin itself that is indicated. She has been diagnosed with bacterial vaginosis recently by her primary doctor, and he is on day 2 of Flagyl. The indication is felt to be related to the discharge from the bacterial vaginosis. Visit of 12/13/17  She had a course of Rocephin, her UTI symptoms resolved completely. In the meantime, she had a repeat CT scan of the chest December 2017, which I compared to prior CTs and discuss it over the phone with the radiologist in [facility].  She started in December 2017 with a 17 mm in the hilar area along with a 16 mm right pretracheal area. This slowly decreased 30 mm on noncontrast CT of April 2017, 12 mm June 2017 . CT chest with contrast, and 8 mm on the current CT, noncontrast, without abnormally enlarged lymph nodes and a decrease in number of the lymph nodes in general.  The patient is pretty happy with results. Continue to be on the doxycycline at this time. Visit 6/11/18  Taking doxycycline until the end of August.  No diarrhea, tolerating very well, no shortness of breath. Otherwise, has a right abdominal wall calcified nodule, within the fat, planned to be resected by Dr. Jeb Pan.     Visit of 9/9/2019  Comes for her yearly visit, she had a chest CT scan negative for new lesions May 2018, and she has been off antibiotics Food insecurity:     Worry: Not on file     Inability: Not on file    Transportation needs:     Medical: Not on file     Non-medical: Not on file   Tobacco Use    Smoking status: Never Smoker    Smokeless tobacco: Never Used   Substance and Sexual Activity    Alcohol use: No    Drug use: No    Sexual activity: Not Currently   Lifestyle    Physical activity:     Days per week: Not on file     Minutes per session: Not on file    Stress: Not on file   Relationships    Social connections:     Talks on phone: Not on file     Gets together: Not on file     Attends Restorationism service: Not on file     Active member of club or organization: Not on file     Attends meetings of clubs or organizations: Not on file     Relationship status: Not on file    Intimate partner violence:     Fear of current or ex partner: Not on file     Emotionally abused: Not on file     Physically abused: Not on file     Forced sexual activity: Not on file   Other Topics Concern    Not on file   Social History Narrative    Not on file       Family History:     Family History   Problem Relation Age of Onset    Heart Disease Other     Diabetes Other         Allergies:   Adalat [nifedipine]; Codeine; Atrovent nasal spray [ipratropium]; Azithromycin; Celebrex [celecoxib]; Ciprofloxacin; Dymista [azelastine-fluticasone]; Flecainide; Iodinated diagnostic agents; Levaquin [levofloxacin]; Lisinopril; Losartan; Minocycline; Other; Percocet [oxycodone-acetaminophen]; Singulair [montelukast]; Tape [adhesive tape]; Ultram [tramadol hcl]; Doll Pilon [uretron d-s]; Azelastine; Benzodiazepines; Diltiazem; Flonase [fluticasone propionate]; Prednisone; and Reglan [metoclopramide]     Review of Systems:   Review of Systems   Constitutional: Positive for fever. HENT: Negative. Eyes: Negative. Respiratory: Positive for cough. Negative for shortness of breath. Cardiovascular: Negative. Gastrointestinal: Negative.   Negative for abdominal pain and

## 2019-09-10 RX ORDER — AMOXICILLIN AND CLAVULANATE POTASSIUM 875; 125 MG/1; MG/1
1 TABLET, FILM COATED ORAL 2 TIMES DAILY
Qty: 20 TABLET | Refills: 0 | OUTPATIENT
Start: 2019-09-10 | End: 2019-09-20

## 2019-09-22 ENCOUNTER — TELEPHONE (OUTPATIENT)
Dept: INFECTIOUS DISEASES | Age: 69
End: 2019-09-22

## 2019-09-22 ASSESSMENT — ENCOUNTER SYMPTOMS: COUGH: 1

## 2019-09-24 DIAGNOSIS — J40 BRONCHITIS: ICD-10-CM

## 2019-11-04 ENCOUNTER — TELEPHONE (OUTPATIENT)
Dept: INFECTIOUS DISEASES | Age: 69
End: 2019-11-04

## 2019-11-06 ENCOUNTER — OFFICE VISIT (OUTPATIENT)
Dept: INFECTIOUS DISEASES | Age: 69
End: 2019-11-06
Payer: MEDICARE

## 2019-11-06 VITALS
DIASTOLIC BLOOD PRESSURE: 81 MMHG | HEART RATE: 79 BPM | BODY MASS INDEX: 45.99 KG/M2 | WEIGHT: 293 LBS | HEIGHT: 67 IN | TEMPERATURE: 97.7 F | SYSTOLIC BLOOD PRESSURE: 139 MMHG

## 2019-11-06 DIAGNOSIS — J40 CHRONIC SINUSITIS WITH RECURRENT BRONCHITIS: ICD-10-CM

## 2019-11-06 DIAGNOSIS — R70.0 ESR RAISED: ICD-10-CM

## 2019-11-06 DIAGNOSIS — J32.9 RECURRENT SINUSITIS: Primary | ICD-10-CM

## 2019-11-06 DIAGNOSIS — J32.9 CHRONIC SINUSITIS WITH RECURRENT BRONCHITIS: ICD-10-CM

## 2019-11-06 PROCEDURE — 99214 OFFICE O/P EST MOD 30 MIN: CPT | Performed by: INTERNAL MEDICINE

## 2019-11-06 PROCEDURE — 1036F TOBACCO NON-USER: CPT | Performed by: INTERNAL MEDICINE

## 2019-11-06 PROCEDURE — 1090F PRES/ABSN URINE INCON ASSESS: CPT | Performed by: INTERNAL MEDICINE

## 2019-11-06 PROCEDURE — G8400 PT W/DXA NO RESULTS DOC: HCPCS | Performed by: INTERNAL MEDICINE

## 2019-11-06 PROCEDURE — 1123F ACP DISCUSS/DSCN MKR DOCD: CPT | Performed by: INTERNAL MEDICINE

## 2019-11-06 PROCEDURE — 4040F PNEUMOC VAC/ADMIN/RCVD: CPT | Performed by: INTERNAL MEDICINE

## 2019-11-06 PROCEDURE — G8484 FLU IMMUNIZE NO ADMIN: HCPCS | Performed by: INTERNAL MEDICINE

## 2019-11-06 PROCEDURE — 3017F COLORECTAL CA SCREEN DOC REV: CPT | Performed by: INTERNAL MEDICINE

## 2019-11-06 PROCEDURE — G8427 DOCREV CUR MEDS BY ELIG CLIN: HCPCS | Performed by: INTERNAL MEDICINE

## 2019-11-06 PROCEDURE — G8417 CALC BMI ABV UP PARAM F/U: HCPCS | Performed by: INTERNAL MEDICINE

## 2019-11-06 ASSESSMENT — ENCOUNTER SYMPTOMS
COUGH: 1
ABDOMINAL PAIN: 0
EYES NEGATIVE: 1
DIARRHEA: 0
ALLERGIC/IMMUNOLOGIC NEGATIVE: 1
SHORTNESS OF BREATH: 0
GASTROINTESTINAL NEGATIVE: 1

## 2020-09-09 ENCOUNTER — TELEMEDICINE (OUTPATIENT)
Dept: INFECTIOUS DISEASES | Age: 70
End: 2020-09-09
Payer: MEDICARE

## 2020-09-09 PROCEDURE — G8400 PT W/DXA NO RESULTS DOC: HCPCS | Performed by: INTERNAL MEDICINE

## 2020-09-09 PROCEDURE — G8417 CALC BMI ABV UP PARAM F/U: HCPCS | Performed by: INTERNAL MEDICINE

## 2020-09-09 PROCEDURE — 3017F COLORECTAL CA SCREEN DOC REV: CPT | Performed by: INTERNAL MEDICINE

## 2020-09-09 PROCEDURE — 1090F PRES/ABSN URINE INCON ASSESS: CPT | Performed by: INTERNAL MEDICINE

## 2020-09-09 PROCEDURE — 1123F ACP DISCUSS/DSCN MKR DOCD: CPT | Performed by: INTERNAL MEDICINE

## 2020-09-09 PROCEDURE — G8427 DOCREV CUR MEDS BY ELIG CLIN: HCPCS | Performed by: INTERNAL MEDICINE

## 2020-09-09 PROCEDURE — 4040F PNEUMOC VAC/ADMIN/RCVD: CPT | Performed by: INTERNAL MEDICINE

## 2020-09-09 PROCEDURE — 99213 OFFICE O/P EST LOW 20 MIN: CPT | Performed by: INTERNAL MEDICINE

## 2020-09-09 PROCEDURE — 1036F TOBACCO NON-USER: CPT | Performed by: INTERNAL MEDICINE

## 2020-09-09 RX ORDER — ACETAMINOPHEN 500 MG
500 TABLET ORAL EVERY 6 HOURS PRN
COMMUNITY

## 2020-09-09 RX ORDER — FLUCONAZOLE 150 MG/1
TABLET ORAL
COMMUNITY
Start: 2018-09-13

## 2020-09-09 RX ORDER — LISINOPRIL 40 MG/1
TABLET ORAL
COMMUNITY

## 2020-09-09 RX ORDER — LEVOTHYROXINE SODIUM 0.03 MG/1
TABLET ORAL
COMMUNITY
Start: 2020-08-17

## 2020-09-09 RX ORDER — GUAIFENESIN 600 MG/1
TABLET, EXTENDED RELEASE ORAL
COMMUNITY

## 2020-09-09 RX ORDER — UBIDECARENONE 75 MG
CAPSULE ORAL
COMMUNITY

## 2020-09-09 RX ORDER — BACILLUS COAGULANS/INULIN 1B-250 MG
CAPSULE ORAL
COMMUNITY

## 2020-09-09 RX ORDER — TERCONAZOLE 80 MG/1
SUPPOSITORY VAGINAL
COMMUNITY
Start: 2020-08-31

## 2020-09-09 NOTE — PROGRESS NOTES
Gayla Galeana is a 71 y.o. female being evaluated by a Virtual Visit (video visit) encounter to address concerns as mentioned above. A caregiver was present when appropriate. Due to this being a TeleHealth encounter (During PXLYZ-21 public health emergency), evaluation of the following organ systems was limited: Vitals/Constitutional/EENT/Resp/CV/GI//MS/Neuro/Skin/Heme-Lymph-Imm. Pursuant to the emergency declaration under the [address] [place] [address] and the [employer] Act, this Virtual Visit was conducted with patient's (and/or legal guardian's) consent, to reduce the patient's risk of exposure to COVID-19 and provide necessary medical care. The patient (and/or legal guardian) has also been advised to contact this office for worsening conditions or problems, and seek emergency medical treatment and/or call 911 if deemed necessary. Services were provided through a video synchronous discussion virtually to substitute for in-person clinic visit. Patient and provider were located at their individual homes. --Liz Ceron MD on 9/9/2020 at 1:35 PM    An electronic signature was used to authenticate this note. [facility] - Initial Consult Note  Today's Date and Time: 11/6/19    Impression :   · Mycobacterium fortuitum bilateral pulmonary infection, diagnosis on the peritracheal lymph node biopsy  · Post meropenem for 4 months- end of August 2017,   · on doxycycline since April 2017 till august 2018  · Cystitis:. E. coli sensitive to ceftriaxone  · Fungal vaginitis, related to antibiotic intake  · 9/9/2019 bronchitis, cough ongoing for 2-month, worse last 2 weeks  · ?  Reflux  · Fibromyalgia  · Interstitial cystitis  · Sjogren's syndrome    Recommendations   No AB - stable - call me if issues - keep mucinex since it has improved the cough ad sinus disease  See me in a year - call arsh if issues arise        Return in about 1 year (around 9/9/2021). History of Present Illness:   Brent Maki is a 71y.o.-year-old  female who presents with   Chief Complaint   Patient presents with    Follow-up     myocobacterial disease pulmonary   Visit of 11/15/17  This is a lady who I follow for a Mycobacterium fortuitum lung infection. Has had bilateral lung disease with nodules up to 1.8 centimeter. Treated with meropenem about four-month finishing and off August, and doxycycline since April 2017, with complete intolerance to multiple other antibiotics that have been started through that course of therapy. She had symptoms of shortness of breath with asthma-like exacerbations and all the symptoms turned down and improved after her course of therapy. She at this time remains on doxycycline by mouth and is due to have a CT scan of the chest early December 2017. Last CT was in June 2017 and had shown a drop in the size of the nodules from 1.8 cm to 0.8 cm. In the interim, in October 5. She had a usual urethral dilation, thought was a little difficult because of scarring, but the patient continued afterwards having a frequency that continues until today. A urine culture showed Klebsiella and E. coli, and repeat cultures showed E. coli alone. Klebsiella was sensitive to Cipro, but E. coli was sensitive to Macrobid as only oral therapy. The E. coli is also sensitive to ceftriaxone. The patient failed to improve after 2 courses of Macrobid and on short course of Cipro to which she developed tendinitis. The count of E. coli was 10-50,000, and she was referred by the PA to see me for opinion in antibiotic need an choice. The patient has no fever or chills, no flank pain. She has burning upon urination, but it seems to be mostly from the skin itself that is indicated.   She has been diagnosed with bacterial vaginosis recently by her primary doctor, and he is on day 2 of Flagyl. The indication is felt to be related to the discharge from the bacterial vaginosis. Visit of 12/13/17  She had a course of Rocephin, her UTI symptoms resolved completely. In the meantime, she had a repeat CT scan of the chest December 2017, which I compared to prior CTs and discuss it over the phone with the radiologist in [facility].  She started in December 2017 with a 17 mm in the hilar area along with a 16 mm right pretracheal area. This slowly decreased 30 mm on noncontrast CT of April 2017, 12 mm June 2017 . CT chest with contrast, and 8 mm on the current CT, noncontrast, without abnormally enlarged lymph nodes and a decrease in number of the lymph nodes in general.  The patient is pretty happy with results. Continue to be on the doxycycline at this time. Visit 6/11/18  Taking doxycycline until the end of August.  No diarrhea, tolerating very well, no shortness of breath. Otherwise, has a right abdominal wall calcified nodule, within the fat, planned to be resected by Dr. Mathieu Mittal. Visit of 9/9/2019  Comes for her yearly visit, she had a chest CT scan negative for new lesions May 2018, and she has been off antibiotics since August 2018. She does have a cough in the morning with some phlegm, has been ongoing for about few months but it has gotten worse for the last 2 weeks. She is on Prilosec, for gastroparesis which was a new diagnosis in her case. Unclear if her cough is reflux, versus a relapse of Mycobacterium which is her concern. She has PND, a sore throat only after coughing. Mild hoarseness with green small gray phlegm. She coughs it all day but it is worse in the morning. On discussion with the patient regarding her reflux, talked about how to prevent reflux with diet and elementary regimens, elevating the head of her bed, avoiding all caffeine. See then if the cough resolves or not.   Otherwise we will get a CT scan of the chest to look for any sleeps. · Hx Mycobacterial infection  · Elevated ESR unclear cause. · See me in 1 year and as needed as needed  Stop the clindamycin  Keep the Augmentin 10 days until she it is done  On Diflucan x2 doses for her oral thrush  Strict reflux diet,  Pending her immunologist working up her possible low immunity    virtual visit 9/9/20  Lost 20 pds on purpose- insulin resistance- no cough- stable- no sob-  Feels great - told by ENT due to Sjogren - better since mucinex. Had sinus infection last time- none in a while. See me in 1 year      I have personally reviewed the past medical history, past surgical history, medications, social history, and family history, and I have updated the database accordingly.   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    Congenital malformation of bladder and urethra     Fracture 2001    2 vertebrate in mid back    Hypertension     Macular pucker     Mycobacterium fortuitum infection     Pulmonary mycobacterial infection ([address]     Sensitivity to sunlight     Extreme    Sjogren's syndrome ([address]     Sjogren's syndrome ([address] 2015    SVT (supraventricular tachycardia) (HCC)     Vertigo     Whiplash 1998    WPW (Castro-Parkinson-White syndrome)        Past Surgical  History:     Past Surgical History:   Procedure Laterality Date    ABLATION OF DYSRHYTHMIC FOCUS      CHOLECYSTECTOMY      HYSTERECTOMY      JOINT REPLACEMENT      ND EGD TRANSORAL BIOPSY SINGLE/MULTIPLE N/A 7/10/2018    EGD performed by Joe Banuelos DO at [facility]    SINUS SURGERY      TONSILLECTOMY      TOTAL KNEE ARTHROPLASTY Bilateral     TUBAL LIGATION      URETHRAL STRICTURE DILATATION      and repair       Medications:     Current Outpatient Medications:     fluconazole (DIFLUCAN) 150 MG tablet, 1 tablet, Disp: , Rfl:     levothyroxine (SYNTHROID) 25 MCG tablet, , Disp: , Rfl:     lisinopril (PRINIVIL;ZESTRIL) 40 MG tablet, lisinopril 40 mg tablet  TAKE 180mg TABLET (40 MG) BY ORAL ROUTE ONCE DAILY, Disp: , Rfl:     metFORMIN (GLUCOPHAGE) 500 MG tablet, metformin 500 mg tablet  one daily - will be starting soon, Disp: , Rfl:     vitamin B-12 (CYANOCOBALAMIN) 100 MCG tablet, Vitamin B12  one daily, Disp: , Rfl:     Bacillus Coagulans-Inulin (PROBIOTIC) 1-250 BILLION-MG CAPS, Probiotic  one daily, Disp: , Rfl:     guaiFENesin (MUCINEX) 600 MG extended release tablet, Mucinex  one every night, Disp: , Rfl:     acetaminophen (TYLENOL) 500 MG tablet, Take 500 mg by mouth every 6 hours as needed, Disp: , Rfl:     terconazole (TERAZOL 3) 80 MG vaginal suppository, , Disp: , Rfl:     levalbuterol (XOPENEX) 0.31 MG/3ML nebulization, Take 1 ampule by nebulization every 4 hours as needed for Wheezing, Disp: , Rfl:     azelastine (ASTELIN) 0.1 % nasal spray, 1 spray by Nasal route 2 times daily Use in each nostril as directed, Disp: , Rfl:     ketoconazole (NIZORAL) 2 % cream, Apply topically daily Apply topically daily. , Disp: , Rfl:     verapamil (CALAN SR) 180 MG extended release tablet, Take 180 mg by mouth nightly, Disp: , Rfl:     mupirocin (BACTROBAN) 2 % ointment, Apply topically 3 times daily Apply topically 3 times daily. , Disp: , Rfl:     nystatin (MYCOSTATIN) 693577 UNIT/GM cream, Apply topically 2 times daily Apply topically 2 times daily. , Disp: , Rfl:     famotidine (PEPCID) 20 MG tablet, Take 40 mg by mouth 2 times daily , Disp: , Rfl:     levalbuterol (XOPENEX HFA) 45 MCG/ACT inhaler, Inhale 1-2 puffs into the lungs every 4 hours as needed for Wheezing, Disp: , Rfl:     furosemide (LASIX) 20 MG tablet, Take 40 mg by mouth daily , Disp: , Rfl:     doxycycline hyclate (VIBRA-TABS) 100 MG tablet, TAKE ONE TABLET BY MOUTH TWICE A DAY (Patient not taking: Reported on 9/9/2020), Disp: 60 tablet, Rfl: 1    doxycycline hyclate (VIBRA-TABS) 100 MG tablet, Take 100 mg by mouth 2 times daily, Disp: , Rfl:     hydroxychloroquine (PLAQUENIL) 200 MG tablet, Take 200 mg by mouth 2 times daily, Disp: , Rfl:       Social History:     Social History     Socioeconomic History    Marital status:      Spouse name: Not on file    Number of children: Not on file    Years of education: Not on file    Highest education level: Not on file   Occupational History    Not on file   Social Needs    Financial resource strain: Not on file    Food insecurity     Worry: Not on file     Inability: Not on file    Transportation needs     Medical: Not on file     Non-medical: Not on file   Tobacco Use    Smoking status: Never Smoker    Smokeless tobacco: Never Used   Substance and Sexual Activity    Alcohol use: No    Drug use: No    Sexual activity: Not Currently   Lifestyle    Physical activity     Days per week: Not on file     Minutes per session: Not on file    Stress: Not on file   Relationships    Social connections     Talks on phone: Not on file     Gets together: Not on file     Attends Gnosticism service: Not on file     Active member of club or organization: Not on file     Attends meetings of clubs or organizations: Not on file     Relationship status: Not on file    Intimate partner violence     Fear of current or ex partner: Not on file     Emotionally abused: Not on file     Physically abused: Not on file     Forced sexual activity: Not on file   Other Topics Concern    Not on file   Social History Narrative    Not on file       Family History:     Family History   Problem Relation Age of Onset    Heart Disease Other     Diabetes Other         Allergies:   Adalat [nifedipine]; Codeine; Atrovent nasal spray [ipratropium]; Azithromycin; Celebrex [celecoxib]; Ciprofloxacin; Dymista [azelastine-fluticasone]; Flecainide; Iodinated diagnostic agents; Levaquin [levofloxacin]; Lisinopril; Losartan; Minocycline;  Other; Percocet [oxycodone-acetaminophen]; Singulair [montelukast]; Tape [adhesive tape]; Ultram [tramadol hcl]; Jerlene Golas [uretron d-s]; Azelastine; Benzodiazepines; Diltiazem; Flonase [fluticasone propionate]; Prednisone; and Reglan [metoclopramide]     Review of Systems:   Review of Systems       Physical Examination :   There were no vitals taken for this visit. Physical Exam         Medical Decision Making:   I have independently reviewed/ordered the following labs:    CBC with Differential:   Lab Results   Component Value Date    WBC 6.3 10/20/2016    WBC 7.0 10/19/2016    HGB 10.3 10/20/2016    HGB 10.6 10/19/2016    HCT 32.1 10/20/2016    HCT 33.8 10/19/2016     10/20/2016     10/19/2016     01/21/2012     01/03/2012    LYMPHOPCT 20 10/19/2016    LYMPHOPCT 18 11/13/2014    MONOPCT 8 10/19/2016    MONOPCT 4 11/13/2014     BMP:   Lab Results   Component Value Date     10/20/2016     10/19/2016    K 4.1 10/20/2016    K 4.4 10/19/2016     10/20/2016     10/19/2016    CO2 21 10/20/2016    CO2 25 10/19/2016    BUN 11 10/20/2016    BUN 15 10/19/2016    CREATININE 0.79 10/20/2016    CREATININE 0.94 10/19/2016    MG 2.0 10/20/2016    MG 1.9 01/21/2012     Hepatic Function Panel:   Lab Results   Component Value Date    PROT 7.4 06/04/2015    ALT 18 10/20/2016    AST 18 10/20/2016     No results found for: RPR  No results found for: HIV  No results found for: [facility]  Lab Results   Component Value Date    MUCUS NOT REPORTED 10/19/2016    RBC 3.93 10/20/2016    RBC 3.15 01/21/2012    TRICHOMONAS NOT REPORTED 10/19/2016    WBC 6.3 10/20/2016    YEAST NOT REPORTED 10/19/2016    TURBIDITY CLEAR 10/19/2016     Lab Results   Component Value Date    CREATININE 0.79 10/20/2016    GLUCOSE 106 10/20/2016    GLUCOSE 117 01/21/2012     Thank you for allowing us to participate in the care of this patient. Please call with questions.       America Crigler, MD  - Office: [phone]    Please note that this chart was generated using voice recognition Dragon dictation software. Although every effort was made to ensure the accuracy of this automated transcription, some errors in transcription may have occurred.

## 2021-10-06 ENCOUNTER — OFFICE VISIT (OUTPATIENT)
Dept: INFECTIOUS DISEASES | Age: 71
End: 2021-10-06
Payer: MEDICARE

## 2021-10-06 VITALS
BODY MASS INDEX: 41.75 KG/M2 | RESPIRATION RATE: 18 BRPM | DIASTOLIC BLOOD PRESSURE: 80 MMHG | OXYGEN SATURATION: 99 % | HEIGHT: 67 IN | HEART RATE: 83 BPM | WEIGHT: 266 LBS | TEMPERATURE: 98.1 F | SYSTOLIC BLOOD PRESSURE: 151 MMHG

## 2021-10-06 DIAGNOSIS — Z86.19: Primary | ICD-10-CM

## 2021-10-06 PROCEDURE — 4040F PNEUMOC VAC/ADMIN/RCVD: CPT | Performed by: INTERNAL MEDICINE

## 2021-10-06 PROCEDURE — 3017F COLORECTAL CA SCREEN DOC REV: CPT | Performed by: INTERNAL MEDICINE

## 2021-10-06 PROCEDURE — G8484 FLU IMMUNIZE NO ADMIN: HCPCS | Performed by: INTERNAL MEDICINE

## 2021-10-06 PROCEDURE — 1090F PRES/ABSN URINE INCON ASSESS: CPT | Performed by: INTERNAL MEDICINE

## 2021-10-06 PROCEDURE — 99214 OFFICE O/P EST MOD 30 MIN: CPT | Performed by: INTERNAL MEDICINE

## 2021-10-06 PROCEDURE — G8400 PT W/DXA NO RESULTS DOC: HCPCS | Performed by: INTERNAL MEDICINE

## 2021-10-06 PROCEDURE — G8427 DOCREV CUR MEDS BY ELIG CLIN: HCPCS | Performed by: INTERNAL MEDICINE

## 2021-10-06 PROCEDURE — 1036F TOBACCO NON-USER: CPT | Performed by: INTERNAL MEDICINE

## 2021-10-06 PROCEDURE — 1123F ACP DISCUSS/DSCN MKR DOCD: CPT | Performed by: INTERNAL MEDICINE

## 2021-10-06 PROCEDURE — G8417 CALC BMI ABV UP PARAM F/U: HCPCS | Performed by: INTERNAL MEDICINE

## 2021-10-06 RX ORDER — VALACYCLOVIR HYDROCHLORIDE 500 MG/1
500 TABLET, FILM COATED ORAL 2 TIMES DAILY
COMMUNITY

## 2021-10-06 NOTE — PROGRESS NOTES
[facility] - Initial Consult Note  Today's Date and Time: 11/6/19    Impression :   · Mycobacterium fortuitum bilateral pulmonary infection, diagnosis on the peritracheal lymph node biopsy  · Post meropenem for 4 months- end of August 2017,   · on doxycycline since April 2017 till august 2018  · Cystitis:. E. coli sensitive to ceftriaxone  · Fungal vaginitis, related to antibiotic intake  · 9/9/2019 bronchitis, cough ongoing for 2-month, worse last 2 weeks  · ? Reflux  · Fibromyalgia  · Interstitial cystitis  · Sjogren's syndrome    Recommendations   See me PRN, FU w PCP w CXR every year      Return if symptoms worsen or fail to improve. History of Present Illness:   Roxie De La Rosa is a 79y.o.-year-old  female who presents with   Chief Complaint   Patient presents with    Sinusitis   Visit of 11/15/17  This is a lady who I follow for a Mycobacterium fortuitum lung infection. Has had bilateral lung disease with nodules up to 1.8 centimeter. Treated with meropenem about four-month finishing and off August, and doxycycline since April 2017, with complete intolerance to multiple other antibiotics that have been started through that course of therapy. She had symptoms of shortness of breath with asthma-like exacerbations and all the symptoms turned down and improved after her course of therapy. She at this time remains on doxycycline by mouth and is due to have a CT scan of the chest early December 2017. Last CT was in June 2017 and had shown a drop in the size of the nodules from 1.8 cm to 0.8 cm. In the interim, in October 5. She had a usual urethral dilation, thought was a little difficult because of scarring, but the patient continued afterwards having a frequency that continues until today. A urine culture showed Klebsiella and E. coli, and repeat cultures showed E. coli alone.   Klebsiella was sensitive to Cipro, but E. coli was sensitive to Macrobid as only oral therapy. The E. coli is also sensitive to ceftriaxone. The patient failed to improve after 2 courses of Macrobid and on short course of Cipro to which she developed tendinitis. The count of E. coli was 10-50,000, and she was referred by the PA to see me for opinion in antibiotic need an choice. The patient has no fever or chills, no flank pain. She has burning upon urination, but it seems to be mostly from the skin itself that is indicated. She has been diagnosed with bacterial vaginosis recently by her primary doctor, and he is on day 2 of Flagyl. The indication is felt to be related to the discharge from the bacterial vaginosis. Visit of 12/13/17  She had a course of Rocephin, her UTI symptoms resolved completely. In the meantime, she had a repeat CT scan of the chest December 2017, which I compared to prior CTs and discuss it over the phone with the radiologist in [facility].  She started in December 2017 with a 17 mm in the hilar area along with a 16 mm right pretracheal area. This slowly decreased 30 mm on noncontrast CT of April 2017, 12 mm June 2017 . CT chest with contrast, and 8 mm on the current CT, noncontrast, without abnormally enlarged lymph nodes and a decrease in number of the lymph nodes in general.  The patient is pretty happy with results. Continue to be on the doxycycline at this time. Visit 6/11/18  Taking doxycycline until the end of August.  No diarrhea, tolerating very well, no shortness of breath. Otherwise, has a right abdominal wall calcified nodule, within the fat, planned to be resected by Dr. Юлия Bay. Visit of 9/9/2019  Comes for her yearly visit, she had a chest CT scan negative for new lesions May 2018, and she has been off antibiotics since August 2018. She does have a cough in the morning with some phlegm, has been ongoing for about few months but it has gotten worse for the last 2 weeks.   She is on Prilosec, for gastroparesis which was a new diagnosis in her case. Unclear if her cough is reflux, versus a relapse of Mycobacterium which is her concern. She has PND, a sore throat only after coughing. Mild hoarseness with green small gray phlegm. She coughs it all day but it is worse in the morning. On discussion with the patient regarding her reflux, talked about how to prevent reflux with diet and elementary regimens, elevating the head of her bed, avoiding all caffeine. See then if the cough resolves or not. Otherwise we will get a CT scan of the chest to look for any relapse of Mycobacterium we will give her Augmentin for bronchitis one course. We will see her in 1 year unless CT scan is positive. Visit 12/2/19  · Bronchitis  - augmentin - CXRneg  · Urethral dilatation  · 10 d ago, not feeling good, cough with green phlegm, CT scan of the chest was done September and came back negative, Dr. gladys escobar gave her a Z-Gabriel. It did not help. · October 1 she got a course of Augmentin for about 20 days, improved slightly but then had a CT of the sinuses without acute disease. · End of October developed aches all over affecting most of the right face no cellulitis associated. Followed by a headache that was ongoing since 9/2019. Given again 7 days of clindamycin, at the 7 which is started burning and the cough is still weak, 11/4/2019 had a nasal swab that was taken and was pending still by Dr. also week. Along all that she had no fever but she has some minimal runny nose discolored. Cough was colored as well. She always had chronic sinusitis but never had episodes relapsing like this sort. Yesterday, she had a left side facial swelling came into the emergency room, she was given Augmentin again. Her sed rate was 87. Yesterday her face was red but now seems to be back to normal.    As immunological work-up, she had a urine light chain positive but the ratio was normal.  She saw Dr. Abby James.   She had an immunoglobin work-up that was also negative. Otherwise she has silent reflux on PPI for dyspepsia. She had a CT scan that was negative. Her hernia. Had in the past for gastroparesis that healed and now has nausea that is easy to occur but no clear reflux. She does not drink caffeine and no tea. No caffeine at all. Even her Cokes are caffeine free. He sleeps in a recliner, she sits up. Diagnosis:  · Persistent bronchitis  · Acute on chronic sinusitis  · Oral thrush  · GERD, but doubt that is causing the bronchitis since she sits up on a chair when she sleeps. · Hx Mycobacterial infection  · Elevated ESR unclear cause. · See me in 1 year and as needed as needed  Stop the clindamycin  Keep the Augmentin 10 days until she it is done  On Diflucan x2 doses for her oral thrush  Strict reflux diet,  Pending her immunologist working up her possible low immunity    virtual visit 9/9/20  Lost 20 pds on purpose- insulin resistance- no cough- stable- no sob-  Feels great - told by ENT due to Sjogren - better since mucinex. Had sinus infection last time- none in a while. See me in 1 year  No AB - stable - call me if issues - keep mucinex since it has improved the cough ad sinus disease  See me in a year - call arsh if issues arise    Visit 10/6/21  Got last week the third shot of covid vaccine and reaction over the arm and fingers more than the second time. Lost wt and looks good. Ins level better- A1C 5.8, better  No cough and no SOB - asthma even better  No more abd pains but legs got a little weaker and uses a walker  Exam neg  Labs ok and cxr ok 8/2021  Plan  See me in a year as needed        I have personally reviewed the past medical history, past surgical history, medications, social history, and family history, and I have updated the database accordingly.   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    Congenital malformation of bladder and urethra     Fracture 2001    2 vertebrate in mid back    Hypertension     Macular pucker     Mycobacterium fortuitum infection     [facility]monary mycobacterial infection ([facility] [address]     Sensitivity to sunlight     Extreme    Sjogren's syndrome ([address]     Sjogren's syndrome ([facility] [address] 2015    SVT (supraventricular tachycardia) (HCC)     Vertigo     Whiplash 1998    WPW (Castro-Parkinson-White syndrome)        Past Surgical  History:     Past Surgical History:   Procedure Laterality Date    ABLATION OF DYSRHYTHMIC FOCUS      CHOLECYSTECTOMY      HYSTERECTOMY      JOINT REPLACEMENT      [place] EGD TRANSORAL BIOPSY SINGLE/MULTIPLE N/A 7/10/2018    EGD performed by Jacinta Herron DO at [facility]    SINUS SURGERY      TONSILLECTOMY      TOTAL KNEE ARTHROPLASTY Bilateral     TUBAL LIGATION      URETHRAL STRICTURE DILATATION      and repair       Medications:     Current Outpatient Medications:     valACYclovir (VALTREX) 500 MG tablet, Take 500 mg by mouth 2 times daily, Disp: , Rfl:     levothyroxine (SYNTHROID) 25 MCG tablet, , Disp: , Rfl:     metFORMIN (GLUCOPHAGE) 500 MG tablet, 250 mg mon wed and friday, Disp: , Rfl:     vitamin B-12 (CYANOCOBALAMIN) 100 MCG tablet, Vitamin B12  one daily, Disp: , Rfl:     Bacillus Coagulans-Inulin (PROBIOTIC) 1-250 BILLION-MG CAPS, Probiotic  one daily, Disp: , Rfl:     guaiFENesin (MUCINEX) 600 MG extended release tablet, Mucinex  one every night, Disp: , Rfl:     acetaminophen (TYLENOL) 500 MG tablet, Take 500 mg by mouth every 6 hours as needed, Disp: , Rfl:     terconazole (TERAZOL 3) 80 MG vaginal suppository, , Disp: , Rfl:     levalbuterol (XOPENEX) 0.31 MG/3ML nebulization, Take 1 ampule by nebulization every 4 hours as needed for Wheezing, Disp: , Rfl:     azelastine (ASTELIN) 0.1 % nasal spray, 1 spray by Nasal route 2 times daily Use in each nostril as directed, Disp: , Rfl:     verapamil (CALAN SR) 180 MG extended release tablet, Take 180 mg by mouth nightly, Disp: , Rfl:     mupirocin (BACTROBAN) 2 % ointment, Apply topically 3 times daily Apply topically 3 times daily. , Disp: , Rfl:     nystatin (MYCOSTATIN) 612248 UNIT/GM cream, Apply topically 2 times daily Apply topically 2 times daily. , Disp: , Rfl:     famotidine (PEPCID) 20 MG tablet, Take 40 mg by mouth 2 times daily , Disp: , Rfl:     levalbuterol (XOPENEX HFA) 45 MCG/ACT inhaler, Inhale 1-2 puffs into the lungs every 4 hours as needed for Wheezing, Disp: , Rfl:     furosemide (LASIX) 20 MG tablet, Take 20 mg by mouth daily , Disp: , Rfl:     fluconazole (DIFLUCAN) 150 MG tablet, 1 tablet (Patient not taking: Reported on 10/6/2021), Disp: , Rfl:     lisinopril (PRINIVIL;ZESTRIL) 40 MG tablet, lisinopril 40 mg tablet  TAKE 180mg TABLET (40 MG) BY ORAL ROUTE ONCE DAILY (Patient not taking: Reported on 10/6/2021), Disp: , Rfl:     doxycycline hyclate (VIBRA-TABS) 100 MG tablet, TAKE ONE TABLET BY MOUTH TWICE A DAY (Patient not taking: Reported on 10/6/2021), Disp: 60 tablet, Rfl: 1    ketoconazole (NIZORAL) 2 % cream, Apply topically daily Apply topically daily.  (Patient not taking: Reported on 10/6/2021), Disp: , Rfl:     doxycycline hyclate (VIBRA-TABS) 100 MG tablet, Take 100 mg by mouth 2 times daily (Patient not taking: Reported on 10/6/2021), Disp: , Rfl:     hydroxychloroquine (PLAQUENIL) 200 MG tablet, Take 200 mg by mouth 2 times daily (Patient not taking: Reported on 10/6/2021), Disp: , Rfl:       Social History:     Social History     Socioeconomic History    Marital status:      Spouse name: Not on file    Number of children: Not on file    Years of education: Not on file    Highest education level: Not on file   Occupational History    Not on file   Tobacco Use    Smoking status: Never Smoker    Smokeless tobacco: Never Used   Substance and Sexual Activity    Alcohol use: No    Drug use: No    Sexual activity: Not Currently   Other Topics Concern    Not on file   Social History Narrative    Not on file     Social Determinants of Health     Financial Resource Strain:     Difficulty of Paying Living Expenses:    Food Insecurity:     Worried About Running Out of Food in the Last Year:     [address] in the Last Year:    Transportation Needs:     Lack of Transportation (Medical):  Lack of Transportation (Non-Medical):    Physical Activity:     Days of Exercise per Week:     Minutes of Exercise per Session:    Stress:     Feeling of Stress :    Social Connections:     Frequency of Communication with Friends and Family:     Frequency of Social Gatherings with Friends and Family:     Attends [employer] Services:     Active Member of Clubs or Organizations:     Attends Club or Organization Meetings:     Marital Status:    Intimate Partner Violence:     Fear of Current or Ex-Partner:     Emotionally Abused:     Physically Abused:     Sexually Abused:        Family History:     Family History   Problem Relation Age of Onset    Heart Disease Other     Diabetes Other         Allergies:   Adalat [nifedipine], Codeine, Atrovent nasal spray [ipratropium], Azithromycin, Celebrex [celecoxib], Ciprofloxacin, Clotrimazole, Dymista [azelastine-fluticasone], Flecainide, Iodinated diagnostic agents, Levaquin [levofloxacin], Lisinopril, Losartan, Minocycline, Other, Oxybutynin, Percocet [oxycodone-acetaminophen], Singulair [montelukast], Tape [adhesive tape], Ultram [tramadol hcl], Uribel [meth-hyo-m bl-na phos-ph sal], Azelastine, Benzodiazepines, Diltiazem, Flonase [fluticasone propionate], Prednisone, and Reglan [metoclopramide]     Review of Systems:   Review of Systems    Physical Examination :   Blood pressure (!) 151/80, pulse 83, temperature 98.1 °F (36.7 °C), temperature source Temporal, resp. rate 18, height 5' 7\" (1.702 m), weight 266 lb (120.7 kg), SpO2 99 %.     Physical Exam      Medical Decision Making:   I have independently reviewed/ordered the following labs:    CBC with Differential:   Lab Results   Component Value Date    WBC 6.3 10/20/2016    WBC 7.0 10/19/2016    HGB 10.3 10/20/2016    HGB 10.6 10/19/2016    HCT 32.1 10/20/2016    HCT 33.8 10/19/2016     10/20/2016     10/19/2016     01/21/2012     01/03/2012    LYMPHOPCT 20 10/19/2016    LYMPHOPCT 18 11/13/2014    MONOPCT 8 10/19/2016    MONOPCT 4 11/13/2014     BMP:   Lab Results   Component Value Date     10/20/2016     10/19/2016    K 4.1 10/20/2016    K 4.4 10/19/2016     10/20/2016     10/19/2016    CO2 21 10/20/2016    CO2 25 10/19/2016    BUN 11 10/20/2016    BUN 15 10/19/2016    CREATININE 0.79 10/20/2016    CREATININE 0.94 10/19/2016    MG 2.0 10/20/2016    MG 1.9 01/21/2012     Hepatic Function Panel:   Lab Results   Component Value Date    PROT 7.4 06/04/2015    ALT 18 10/20/2016    AST 18 10/20/2016     No results found for: RPR  No results found for: HIV  No results found for: [facility]  Lab Results   Component Value Date    MUCUS NOT REPORTED 10/19/2016    RBC 3.93 10/20/2016    RBC 3.15 01/21/2012    TRICHOMONAS NOT REPORTED 10/19/2016    WBC 6.3 10/20/2016    YEAST NOT REPORTED 10/19/2016    TURBIDITY CLEAR 10/19/2016     Lab Results   Component Value Date    CREATININE 0.79 10/20/2016    GLUCOSE 106 10/20/2016    GLUCOSE 117 01/21/2012     Thank you for allowing us to participate in the care of this patient. Please call with questions. Chandrika Cornelius MD  - Office: [phone]    Please note that this chart was generated using voice recognition Dragon dictation software. Although every effort was made to ensure the accuracy of this automated transcription, some errors in transcription may have occurred.

## 2022-09-01 ENCOUNTER — HOSPITAL ENCOUNTER (OUTPATIENT)
Dept: ULTRASOUND IMAGING | Age: 72
Discharge: HOME | End: 2022-09-03
Payer: MEDICARE

## 2022-09-01 ENCOUNTER — HOSPITAL ENCOUNTER (OUTPATIENT)
Age: 72
Setting detail: SPECIMEN
Discharge: HOME | End: 2022-09-01

## 2022-09-01 DIAGNOSIS — M54.9 DORSALGIA: ICD-10-CM

## 2022-09-01 DIAGNOSIS — Z87.442 PERSONAL HISTORY OF URINARY CALCULI: ICD-10-CM

## 2022-09-01 PROCEDURE — 76775 US EXAM ABDO BACK WALL LIM: CPT

## 2022-09-02 LAB
CULTURE: NO GROWTH
SPECIMEN DESCRIPTION: NORMAL

## 2022-10-06 ENCOUNTER — APPOINTMENT (OUTPATIENT)
Dept: GENERAL RADIOLOGY | Age: 72
End: 2022-10-06
Payer: MEDICARE

## 2022-10-06 ENCOUNTER — HOSPITAL ENCOUNTER (EMERGENCY)
Age: 72
Discharge: HOME | End: 2022-10-06
Attending: EMERGENCY MEDICINE
Payer: MEDICARE

## 2022-10-06 VITALS
RESPIRATION RATE: 18 BRPM | OXYGEN SATURATION: 98 % | WEIGHT: 287 LBS | DIASTOLIC BLOOD PRESSURE: 80 MMHG | HEIGHT: 67 IN | TEMPERATURE: 98.4 F | HEART RATE: 80 BPM | BODY MASS INDEX: 45.04 KG/M2 | SYSTOLIC BLOOD PRESSURE: 138 MMHG

## 2022-10-06 DIAGNOSIS — I10 HYPERTENSION, UNSPECIFIED TYPE: Primary | ICD-10-CM

## 2022-10-06 LAB
ABSOLUTE EOS #: 0.3 K/UL (ref 0–0.4)
ABSOLUTE LYMPH #: 2.4 K/UL (ref 1–4.8)
ABSOLUTE MONO #: 0.5 K/UL (ref 0.1–1.2)
ANION GAP SERPL CALCULATED.3IONS-SCNC: 15 MMOL/L (ref 9–17)
BASOPHILS # BLD: 0 % (ref 0–2)
BASOPHILS ABSOLUTE: 0 K/UL (ref 0–0.2)
BUN BLDV-MCNC: 13 MG/DL (ref 8–23)
CALCIUM SERPL-MCNC: 9.4 MG/DL (ref 8.6–10.4)
CHLORIDE BLD-SCNC: 100 MMOL/L (ref 98–107)
CO2: 25 MMOL/L (ref 20–31)
CREAT SERPL-MCNC: 0.74 MG/DL (ref 0.5–0.9)
EOSINOPHILS RELATIVE PERCENT: 3 % (ref 1–4)
GFR SERPL CREATININE-BSD FRML MDRD: >60 ML/MIN/1.73M2
GLUCOSE BLD-MCNC: 107 MG/DL (ref 70–99)
HCT VFR BLD CALC: 40.9 % (ref 36–46)
HEMOGLOBIN: 13.5 G/DL (ref 12–16)
LYMPHOCYTES # BLD: 25 % (ref 24–44)
MCH RBC QN AUTO: 30.8 PG (ref 26–34)
MCHC RBC AUTO-ENTMCNC: 33.1 G/DL (ref 31–37)
MCV RBC AUTO: 93.1 FL (ref 80–100)
MONOCYTES # BLD: 6 % (ref 2–11)
PDW BLD-RTO: 15.3 % (ref 12.5–15.4)
PLATELET # BLD: 285 K/UL (ref 140–450)
PMV BLD AUTO: 7.9 FL (ref 6–12)
POTASSIUM SERPL-SCNC: 3.4 MMOL/L (ref 3.7–5.3)
RBC # BLD: 4.39 M/UL (ref 4–5.2)
SEG NEUTROPHILS: 66 % (ref 36–66)
SEGMENTED NEUTROPHILS ABSOLUTE COUNT: 6.3 K/UL (ref 1.8–7.7)
SODIUM BLD-SCNC: 140 MMOL/L (ref 135–144)
TROPONIN, HIGH SENSITIVITY: 13 NG/L (ref 0–14)
WBC # BLD: 9.6 K/UL (ref 3.5–11)

## 2022-10-06 PROCEDURE — 80048 BASIC METABOLIC PNL TOTAL CA: CPT

## 2022-10-06 PROCEDURE — 85025 COMPLETE CBC W/AUTO DIFF WBC: CPT

## 2022-10-06 PROCEDURE — 93005 ELECTROCARDIOGRAM TRACING: CPT | Performed by: EMERGENCY MEDICINE

## 2022-10-06 PROCEDURE — 84484 ASSAY OF TROPONIN QUANT: CPT

## 2022-10-06 PROCEDURE — 36415 COLL VENOUS BLD VENIPUNCTURE: CPT

## 2022-10-06 PROCEDURE — 71045 X-RAY EXAM CHEST 1 VIEW: CPT

## 2022-10-06 PROCEDURE — 99285 EMERGENCY DEPT VISIT HI MDM: CPT

## 2022-10-06 ASSESSMENT — PAIN - FUNCTIONAL ASSESSMENT: PAIN_FUNCTIONAL_ASSESSMENT: NONE - DENIES PAIN

## 2022-10-06 NOTE — ED PROVIDER NOTES
[id number] [facility]  [address] [facility] [address] [facility] [place] [address]  Phone: [phone]  Fax: Cathryn Wallace [phone]      Pt Name: Alexis Sin  MRN: [id number]  Davegfmonserrat 1950  Date of evaluation: 10/6/2022    CHIEF COMPLAINT       Chief Complaint   Patient presents with    Hypertension     Pt felt off so checked BP @ home & it was 160/90- went to PT & they checked it PTA BP-160/110  PCP told pt to come to [facility] Alfredo Newberry is a 70 y.o. female who presents to the emergency department with hypertension. At home blood pressure was significantly elevated for her in the 160s over 90s went to physical therapy and she was 160s over 110. Her primary care physician told her to come to the emergency department. She denies any chest pain or shortness of breath. She has had chronic blurred vision for several weeks to months and is scheduled to see her eye doctor soon. She denies any double vision no photophobia. She has chronic neck pain from a recent procedure and wonders if the pain is causing her blood pressure to be elevated. She denies any focal weakness. No other symptoms. REVIEW OF SYSTEMS       Constitutional: No fevers or chills   HEENT: No sore throat, rhinorrhea, or earache   Eyes: Positive chronic blurry vision no double vision or drainage  Cardiovascular: No chest pain or tachycardia   Respiratory: No wheezing or shortness of breath no cough   Gastrointestinal: No nausea, vomiting, diarrhea, constipation, or abdominal pain   : No hematuria or dysuria   Musculoskeletal: Chronic neck pain no extremity swelling or pain.   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 SURGICAL HISTORY      has a past surgical history that includes Tonsillectomy; Tubal ligation; Hysterectomy; joint replacement; ablation of dysrhythmic focus; Cholecystectomy; sinus surgery; Total knee arthroplasty (Bilateral); Urethra dilation; and pr egd transoral biopsy single/multiple (N/A, 7/10/2018).     CURRENT MEDICATIONS       Discharge Medication List as of 10/6/2022  5:37 PM        CONTINUE these medications which have NOT CHANGED    Details   valACYclovir (VALTREX) 500 MG tablet Take 500 mg by mouth 2 times dailyHistorical Med      fluconazole (DIFLUCAN) 150 MG tablet 1 tabletHistorical Med      levothyroxine (SYNTHROID) 25 MCG tablet Historical Med      lisinopril (PRINIVIL;ZESTRIL) 40 MG tablet lisinopril 40 mg tablet   TAKE 180mg TABLET (40 MG) BY ORAL ROUTE ONCE DAILYHistorical Med      metFORMIN (GLUCOPHAGE) 500 MG tablet 250 mg mon wed and fridayHistorical Med      vitamin B-12 (CYANOCOBALAMIN) 100 MCG tablet Vitamin B12   one dailyHistorical Med      Bacillus Coagulans-Inulin (PROBIOTIC) 1-250 BILLION-MG CAPS Probiotic   one dailyHistorical Med      guaiFENesin (MUCINEX) 600 MG extended release tablet Mucinex   one every nightHistorical Med      acetaminophen (TYLENOL) 500 MG tablet Take 500 mg by mouth every 6 hours as neededHistorical Med      terconazole (TERAZOL 3) 80 MG vaginal suppository Historical Med      !! doxycycline hyclate (VIBRA-TABS) 100 MG tablet TAKE ONE TABLET BY MOUTH TWICE A DAY, Disp-60 tablet, R-1Normal      levalbuterol (XOPENEX) 0.31 MG/3ML nebulization Take 1 ampule by nebulization every 4 hours as needed for WheezingHistorical Med      azelastine (ASTELIN) 0.1 % nasal spray 1 spray by Nasal route 2 times daily Use in each nostril as directedHistorical Med      ketoconazole (NIZORAL) 2 % cream Apply topically daily Apply topically daily. , Topical, DAILY, Historical Med      verapamil (CALAN SR) 180 MG extended release tablet Take 180 mg by mouth nightlyHistorical Med      !! doxycycline hyclate (VIBRA-TABS) 100 MG tablet Take 100 mg by mouth 2 times dailyHistorical Med      mupirocin (BACTROBAN) 2 % ointment Apply topically 3 times daily Apply topically 3 times daily. , Topical, 3 TIMES DAILY, Historical Med      nystatin (MYCOSTATIN) 618922 UNIT/GM cream Apply topically 2 times daily Apply topically 2 times daily. , Topical, 2 TIMES DAILY, Historical Med      famotidine (PEPCID) 20 MG tablet Take 40 mg by mouth 2 times daily Historical Med      levalbuterol (XOPENEX HFA) 45 MCG/ACT inhaler Inhale 1-2 puffs into the lungs every 4 hours as needed for WheezingHistorical Med      furosemide (LASIX) 20 MG tablet Take 20 mg by mouth daily Historical Med      hydroxychloroquine (PLAQUENIL) 200 MG tablet Take 200 mg by mouth 2 times daily       ! ! - Potential duplicate medications found. Please discuss with provider. ALLERGIES     is allergic to adalat [nifedipine], codeine, atrovent nasal spray [ipratropium], azithromycin, celebrex [celecoxib], ciprofloxacin, clotrimazole, dymista [azelastine-fluticasone], flecainide, iodinated diagnostic agents, levaquin [levofloxacin], lisinopril, losartan, minocycline, other, oxybutynin, percocet [oxycodone-acetaminophen], singulair [montelukast], tape [adhesive tape], ultram [tramadol hcl], uribel [meth-hyo-m bl-na phos-ph sal], azelastine, benzodiazepines, diltiazem, flonase [fluticasone propionate], prednisone, and reglan [metoclopramide]. FAMILY HISTORY     She indicated that the status of her other is unknown.     family history includes Diabetes in an other family member; Heart Disease in an other family member.     SOCIAL HISTORY      reports that she has never smoked. She has never used smokeless tobacco. She reports that she does not drink alcohol and does not use drugs. PHYSICAL EXAM       ED Triage Vitals [10/06/22 1542]   BP Temp Temp Source Heart Rate Resp SpO2 Height Weight   (!)  140/90 98.4 °F (36.9 °C) Oral 92 18 98 % 5' 7\" (1.702 m) 287 lb (130.2 kg)       Constitutional: Alert, oriented x3, nontoxic, answering questions appropriately, acting properly for age, in no acute distress   HEENT: Extraocular muscles intact, pupils equal round reactive to light no photophobia  Neck: Trachea midline no meningismus  Cardiovascular: Regular rhythm and rate no murmurs   Respiratory: Diminished to auscultation bilaterally no wheezes, rhonchi, rales, no respiratory distress no tachypnea no retractions no hypoxia  Gastrointestinal: Soft, nontender, obese, diminished bowel sounds. No rebound, rigidity, or guarding. Musculoskeletal: No extremity pain or swelling no calf tenderness or asymmetry  Neurologic: Moving all 4 extremities without difficulty there are no gross focal neurologic deficits   Skin: Warm and dry       DIFFERENTIAL DIAGNOSIS/ MDM:     Hypertension. IV and labs. EKG. Chest imaging. Blood pressure 140/90. DIAGNOSTIC RESULTS     EKG: All EKG's are interpreted by the Emergency Department Physician who either signs or Co-signs this chart in the absence of a cardiologist.    1601 sinus rhythm rate 83  QRS 86  no acute ST or T wave changes.     Not indicated unless otherwise documented above    LABS:  Results for orders placed or performed during the hospital encounter of 10/06/22   CBC with Auto Differential   Result Value Ref Range    WBC 9.6 3.5 - 11.0 k/uL    RBC 4.39 4.0 - 5.2 m/uL    Hemoglobin 13.5 12.0 - 16.0 g/dL    Hematocrit 40.9 36 - 46 %    MCV 93.1 80 - 100 fL    MCH 30.8 26 - 34 pg    MCHC 33.1 31 - 37 g/dL    RDW 15.3 12.5 - 15.4 %    Platelets 002 772 - 496 k/uL    MPV 7.9 6.0 - 12.0 fL    Seg Neutrophils 66 36 - 66 % Lymphocytes 25 24 - 44 %    Monocytes 6 2 - 11 %    Eosinophils % 3 1 - 4 %    Basophils 0 0 - 2 %    Segs Absolute 6.30 1.8 - 7.7 k/uL    Absolute Lymph # 2.40 1.0 - 4.8 k/uL    Absolute Mono # 0.50 0.1 - 1.2 k/uL    Absolute Eos # 0.30 0.0 - 0.4 k/uL    Basophils Absolute 0.00 0.0 - 0.2 k/uL   Basic Metabolic Panel   Result Value Ref Range    Glucose 107 (H) 70 - 99 mg/dL    BUN 13 8 - 23 mg/dL    Creatinine 0.74 0.50 - 0.90 mg/dL    Est, Glom Filt Rate >60 >60 mL/min/1.73m2    Calcium 9.4 8.6 - 10.4 mg/dL    Sodium 140 135 - 144 mmol/L    Potassium 3.4 (L) 3.7 - 5.3 mmol/L    Chloride 100 98 - 107 mmol/L    CO2 25 20 - 31 mmol/L    Anion Gap 15 9 - 17 mmol/L   Troponin   Result Value Ref Range    Troponin, High Sensitivity 13 0 - 14 ng/L   EKG 12 Lead   Result Value Ref Range    Ventricular Rate 83 BPM    Atrial Rate 83 BPM    P-R Interval 164 ms    QRS Duration 86 ms    Q-T Interval 372 ms    QTc Calculation (Bazett) 437 ms    P Axis 25 degrees    R Axis 4 degrees    T Axis 96 degrees       Not indicated unless otherwise documented above    RADIOLOGY:   I reviewed the radiologist interpretations:    XR CHEST PORTABLE   Final Result   No acute cardiopulmonary abnormality. Not indicated unless otherwise documented above    EMERGENCY DEPARTMENT COURSE:     The patient was given the following medications:  No orders of the defined types were placed in this encounter. Vitals:   -------------------------  /80   Pulse 80   Temp 98.4 °F (36.9 °C) (Oral)   Resp 18   Ht 5' 7\" (1.702 m)   Wt 130.2 kg (287 lb)   SpO2 98%   BMI 44.95 kg/m²     5:35 PM blood pressure remains stable 140/80 normal CBC normal electrolytes and kidney function. Troponin of 13. EKG no ST elevations. Chest x-ray no infiltrate. Monitor blood pressure at home. Follow-up with family physician call tomorrow for an appointment and return if worsening symptoms or any other concerns.     The patient and her  understands that at this time there is no evidence for a more malignant underlying process, but also understands that early in the process of an illness or injury, an emergency department workup can be falsely reassuring. Routine discharge counseling was given, and it is understood that worsening, changing or persistent symptoms should prompt an immediate call or follow up with their primary physician or return to the emergency department. The importance of appropriate follow up was also discussed. I have reviewed the disposition diagnosis. I have answered the questions and given discharge instructions. There was voiced understanding of these instructions and no further questions or complaints. CRITICAL CARE:    None    CONSULTS:    None    PROCEDURES:    None      OARRS Report if indicated             FINAL IMPRESSION      1.  Hypertension, unspecified type          DISPOSITION/PLAN   DISPOSITION Decision To Discharge 10/06/2022 05:36:39 PM        CONDITION ON DISPOSITION: STABLE       PATIENT REFERRED TO:  Filomena Nath MD  [address] [place] [address] [phone]    Call in 1 day      DISCHARGE MEDICATIONS:  Discharge Medication List as of 10/6/2022  5:37 PM          (Please note that portions of this note were completed with a voice recognition program.  Efforts were made to edit the dictations but occasionally words are mis-transcribed.)    Naresh Torres DO   Attending Emergency Physician     Naresh Torres DO  10/07/22 5383

## 2022-10-06 NOTE — DISCHARGE INSTRUCTIONS
Follow-up with family physician for reevaluation  Return immediately if any worsening symptoms or any other concerns    Tell us how we did visit: [email or web address]   and let us know about your experience

## 2022-10-07 LAB
EKG ATRIAL RATE: 83 BPM
EKG P AXIS: 25 DEGREES
EKG P-R INTERVAL: 164 MS
EKG Q-T INTERVAL: 372 MS
EKG QRS DURATION: 86 MS
EKG QTC CALCULATION (BAZETT): 437 MS
EKG R AXIS: 4 DEGREES
EKG T AXIS: 96 DEGREES
EKG VENTRICULAR RATE: 83 BPM

## 2023-06-22 ENCOUNTER — HOSPITAL ENCOUNTER (OUTPATIENT)
Age: 73
Setting detail: SPECIMEN
Discharge: HOME | End: 2023-06-22

## 2023-06-23 LAB
MICROORGANISM SPEC CULT: NORMAL
SPECIMEN DESCRIPTION: NORMAL

## 2023-06-28 ENCOUNTER — HOSPITAL ENCOUNTER (OUTPATIENT)
Age: 73
Setting detail: THERAPIES SERIES
Discharge: HOME | End: 2023-06-28

## 2023-07-20 ENCOUNTER — HOSPITAL ENCOUNTER (OUTPATIENT)
Age: 73
Setting detail: THERAPIES SERIES
Discharge: HOME | End: 2023-07-20

## 2023-07-20 NOTE — FLOWSHEET NOTE
[x] [address] [facility]  P: [phone]  F: [phone]     Physical Therapy Cancel/No Show note    Date: 2023  Patient: Rebecca Chauhan  : 1950  MRN: [id number]    Cancels/No Shows to date:     For today's appointment patient:    [x]  Cancelled    [] Rescheduled appointment    [] No-show     Reason given by patient:    [x]  Patient ill: phlebitis    []  Conflicting appointment    [] No transportation      [] Conflict with work    [] No reason given    [] Weather related    [] COVID-19    [] Other:      Comments:  no symptoms      [x] Next appointment was confirmed    Electronically signed by:  Florence Carter PT

## 2023-09-12 ENCOUNTER — HOSPITAL ENCOUNTER (OUTPATIENT)
Age: 73
Setting detail: SPECIMEN
Discharge: HOME | End: 2023-09-12

## 2023-09-15 LAB
MICROORGANISM SPEC CULT: NORMAL
SPECIMEN DESCRIPTION: NORMAL

## 2023-09-18 LAB
MICROORGANISM SPEC CULT: NORMAL
SPECIMEN DESCRIPTION: NORMAL

## 2023-10-23 ENCOUNTER — HOSPITAL ENCOUNTER (OUTPATIENT)
Age: 73
Setting detail: SPECIMEN
Discharge: HOME | End: 2023-10-23

## 2023-10-23 LAB
ANION GAP SERPL CALCULATED.3IONS-SCNC: 16 MMOL/L (ref 9–16)
BASOPHILS # BLD: 0.04 K/UL (ref 0–0.2)
BASOPHILS NFR BLD: 0 % (ref 0–2)
BUN SERPL-MCNC: 14 MG/DL (ref 8–23)
CALCIUM SERPL-MCNC: 10.1 MG/DL (ref 8.6–10.4)
CHLORIDE SERPL-SCNC: 102 MMOL/L (ref 98–107)
CO2 SERPL-SCNC: 22 MMOL/L (ref 20–31)
CREAT SERPL-MCNC: 0.8 MG/DL (ref 0.5–0.9)
EOSINOPHIL # BLD: 0.33 K/UL (ref 0–0.44)
EOSINOPHILS RELATIVE PERCENT: 4 % (ref 1–4)
ERYTHROCYTE [DISTWIDTH] IN BLOOD BY AUTOMATED COUNT: 14.9 % (ref 11.8–14.4)
GFR SERPL CREATININE-BSD FRML MDRD: >60 ML/MIN/1.73M2
GLUCOSE P FAST SERPL-MCNC: 107 MG/DL (ref 74–99)
HCT VFR BLD AUTO: 44.8 % (ref 36.3–47.1)
HGB BLD-MCNC: 14.1 G/DL (ref 11.9–15.1)
IMM GRANULOCYTES # BLD AUTO: 0.05 K/UL (ref 0–0.3)
IMM GRANULOCYTES NFR BLD: 1 %
INR PPP: 1
LYMPHOCYTES NFR BLD: 2.82 K/UL (ref 1.1–3.7)
LYMPHOCYTES RELATIVE PERCENT: 30 % (ref 24–43)
MCH RBC QN AUTO: 29.6 PG (ref 25.2–33.5)
MCHC RBC AUTO-ENTMCNC: 31.5 G/DL (ref 28.4–34.8)
MCV RBC AUTO: 93.9 FL (ref 82.6–102.9)
MONOCYTES NFR BLD: 0.59 K/UL (ref 0.1–1.2)
MONOCYTES NFR BLD: 6 % (ref 3–12)
NEUTROPHILS NFR BLD: 59 % (ref 36–65)
NEUTS SEG NFR BLD: 5.56 K/UL (ref 1.5–8.1)
NRBC BLD-RTO: 0 PER 100 WBC
PLATELET # BLD AUTO: 321 K/UL (ref 138–453)
PMV BLD AUTO: 11 FL (ref 8.1–13.5)
POTASSIUM SERPL-SCNC: 4.3 MMOL/L (ref 3.7–5.3)
PROTHROMBIN TIME: 13.2 SEC (ref 11.7–14.9)
RBC # BLD AUTO: 4.77 M/UL (ref 3.95–5.11)
RBC # BLD: ABNORMAL 10*6/UL
SODIUM SERPL-SCNC: 140 MMOL/L (ref 136–145)
WBC OTHER # BLD: 9.4 K/UL (ref 3.5–11.3)

## 2023-11-17 ENCOUNTER — HOSPITAL ENCOUNTER (OUTPATIENT)
Age: 73
Setting detail: SPECIMEN
Discharge: HOME | End: 2023-11-17

## 2023-11-17 LAB
BACTERIA URNS QL MICRO: ABNORMAL
BILIRUB UR QL STRIP: NEGATIVE
CASTS #/AREA URNS LPF: ABNORMAL /LPF (ref 0–2)
CASTS #/AREA URNS LPF: ABNORMAL /LPF (ref 0–2)
CLARITY UR: ABNORMAL
COLOR UR: YELLOW
EPI CELLS #/AREA URNS HPF: ABNORMAL /HPF (ref 0–5)
GLUCOSE UR STRIP-MCNC: NEGATIVE MG/DL
HGB UR QL STRIP.AUTO: NEGATIVE
KETONES UR STRIP-MCNC: ABNORMAL MG/DL
LEUKOCYTE ESTERASE UR QL STRIP: ABNORMAL
MUCOUS THREADS URNS QL MICRO: ABNORMAL
NITRITE UR QL STRIP: NEGATIVE
PH UR STRIP: 5 [PH] (ref 5–8)
PROT UR STRIP-MCNC: ABNORMAL MG/DL
RBC #/AREA URNS HPF: ABNORMAL /HPF (ref 0–2)
SP GR UR STRIP: 1.03 (ref 1–1.03)
UROBILINOGEN UR STRIP-ACNC: NORMAL EU/DL (ref 0–1)
WBC #/AREA URNS HPF: ABNORMAL /HPF (ref 0–5)

## 2023-11-18 LAB
MICROORGANISM SPEC CULT: NORMAL
SPECIMEN DESCRIPTION: NORMAL

## 2024-06-06 ENCOUNTER — HOSPITAL ENCOUNTER (OUTPATIENT)
Age: 74
Setting detail: SPECIMEN
Discharge: HOME | End: 2024-06-06

## 2024-06-06 LAB
ANION GAP SERPL CALCULATED.3IONS-SCNC: 18 MMOL/L (ref 9–16)
BUN SERPL-MCNC: 16 MG/DL (ref 8–23)
CALCIUM SERPL-MCNC: 10.2 MG/DL (ref 8.6–10.4)
CHLORIDE SERPL-SCNC: 102 MMOL/L (ref 98–107)
CO2 SERPL-SCNC: 21 MMOL/L (ref 20–31)
CREAT SERPL-MCNC: 1 MG/DL (ref 0.5–0.9)
CREAT UR-MCNC: 249 MG/DL (ref 28–217)
ERYTHROCYTE [DISTWIDTH] IN BLOOD BY AUTOMATED COUNT: 15.2 % (ref 11.8–14.4)
ERYTHROCYTE [SEDIMENTATION RATE] IN BLOOD BY PHOTOMETRIC METHOD: 59 MM/HR (ref 0–30)
EST. AVERAGE GLUCOSE BLD GHB EST-MCNC: 126 MG/DL
GFR, ESTIMATED: 59 ML/MIN/1.73M2
GLUCOSE SERPL-MCNC: 94 MG/DL (ref 74–99)
HBA1C MFR BLD: 6 % (ref 4–6)
HCT VFR BLD AUTO: 45.2 % (ref 36.3–47.1)
HGB BLD-MCNC: 14.2 G/DL (ref 11.9–15.1)
MCH RBC QN AUTO: 30.5 PG (ref 25.2–33.5)
MCHC RBC AUTO-ENTMCNC: 31.4 G/DL (ref 28.4–34.8)
MCV RBC AUTO: 97.2 FL (ref 82.6–102.9)
MICROALBUMIN UR-MCNC: 206 MG/L (ref 0–20)
MICROALBUMIN/CREAT UR-RTO: 83 MCG/MG CREAT (ref 0–25)
NRBC BLD-RTO: 0 PER 100 WBC
PLATELET # BLD AUTO: 320 K/UL (ref 138–453)
PMV BLD AUTO: 10.6 FL (ref 8.1–13.5)
POTASSIUM SERPL-SCNC: 4.3 MMOL/L (ref 3.7–5.3)
RBC # BLD AUTO: 4.65 M/UL (ref 3.95–5.11)
SODIUM SERPL-SCNC: 141 MMOL/L (ref 136–145)
TSH SERPL DL<=0.05 MIU/L-ACNC: 3.01 UIU/ML (ref 0.27–4.2)
VIT B12 SERPL-MCNC: 473 PG/ML (ref 232–1245)
WBC OTHER # BLD: 11 K/UL (ref 3.5–11.3)

## 2024-09-12 ENCOUNTER — HOSPITAL ENCOUNTER (OUTPATIENT)
Age: 74
Setting detail: SPECIMEN
Discharge: HOME | End: 2024-09-12

## 2024-09-12 LAB
BACTERIA URNS QL MICRO: NORMAL
BILIRUB UR QL STRIP: NEGATIVE
CASTS #/AREA URNS LPF: NORMAL /LPF (ref 0–8)
CLARITY UR: CLEAR
COLOR UR: YELLOW
EPI CELLS #/AREA URNS HPF: NORMAL /HPF (ref 0–5)
GLUCOSE UR STRIP-MCNC: NEGATIVE MG/DL
HGB UR QL STRIP.AUTO: NEGATIVE
KETONES UR STRIP-MCNC: NEGATIVE MG/DL
LEUKOCYTE ESTERASE UR QL STRIP: ABNORMAL
NITRITE UR QL STRIP: NEGATIVE
PH UR STRIP: 6.5 [PH] (ref 5–8)
PROT UR STRIP-MCNC: ABNORMAL MG/DL
RBC #/AREA URNS HPF: NORMAL /HPF (ref 0–4)
SP GR UR STRIP: 1.02 (ref 1–1.03)
UROBILINOGEN UR STRIP-ACNC: NORMAL EU/DL (ref 0–1)
WBC #/AREA URNS HPF: NORMAL /HPF (ref 0–5)

## 2024-09-13 LAB
MICROORGANISM SPEC CULT: NORMAL
SPECIMEN DESCRIPTION: NORMAL

## 2024-11-05 ENCOUNTER — HOSPITAL ENCOUNTER (OUTPATIENT)
Age: 74
Setting detail: SPECIMEN
Discharge: HOME | End: 2024-11-05

## 2024-11-05 LAB
ALBUMIN SERPL-MCNC: 4.6 G/DL (ref 3.5–5.2)
ALBUMIN/GLOB SERPL: 1.2 {RATIO} (ref 1–2.5)
ALP SERPL-CCNC: 91 U/L (ref 35–104)
ALT SERPL-CCNC: 30 U/L (ref 10–35)
ANION GAP SERPL CALCULATED.3IONS-SCNC: 18 MMOL/L (ref 9–16)
AST SERPL-CCNC: 32 U/L (ref 10–35)
BILIRUB SERPL-MCNC: 0.4 MG/DL (ref 0–1.2)
BUN SERPL-MCNC: 16 MG/DL (ref 8–23)
CALCIUM SERPL-MCNC: 10.2 MG/DL (ref 8.6–10.4)
CHLORIDE SERPL-SCNC: 100 MMOL/L (ref 98–107)
CO2 SERPL-SCNC: 22 MMOL/L (ref 20–31)
CREAT SERPL-MCNC: 0.8 MG/DL (ref 0.6–0.9)
ERYTHROCYTE [DISTWIDTH] IN BLOOD BY AUTOMATED COUNT: 15.5 % (ref 11.8–14.4)
GFR, ESTIMATED: 77 ML/MIN/1.73M2
GLUCOSE SERPL-MCNC: 106 MG/DL (ref 74–99)
HCT VFR BLD AUTO: 44.3 % (ref 36.3–47.1)
HGB BLD-MCNC: 13.8 G/DL (ref 11.9–15.1)
IRON SERPL-MCNC: 57 UG/DL (ref 37–145)
LIPASE SERPL-CCNC: 32 U/L (ref 13–60)
MCH RBC QN AUTO: 31 PG (ref 25.2–33.5)
MCHC RBC AUTO-ENTMCNC: 31.2 G/DL (ref 28.4–34.8)
MCV RBC AUTO: 99.6 FL (ref 82.6–102.9)
NRBC BLD-RTO: 0 PER 100 WBC
PLATELET # BLD AUTO: 356 K/UL (ref 138–453)
PMV BLD AUTO: 10.8 FL (ref 8.1–13.5)
POTASSIUM SERPL-SCNC: 4.1 MMOL/L (ref 3.7–5.3)
PROT SERPL-MCNC: 8.3 G/DL (ref 6.6–8.7)
RBC # BLD AUTO: 4.45 M/UL (ref 3.95–5.11)
SODIUM SERPL-SCNC: 140 MMOL/L (ref 136–145)
WBC OTHER # BLD: 8.7 K/UL (ref 3.5–11.3)

## 2024-11-06 LAB
C DIFFICILE TOXINS, PCR: NORMAL
CAMPYLOBACTER DNA SPEC NAA+PROBE: NORMAL
ETEC ELTA+ESTB GENES STL QL NAA+PROBE: NORMAL
P SHIGELLOIDES DNA STL QL NAA+PROBE: NORMAL
SALMONELLA DNA SPEC QL NAA+PROBE: NORMAL
SHIGA TOXIN STX GENE SPEC NAA+PROBE: NORMAL
SHIGELLA DNA SPEC QL NAA+PROBE: NORMAL
SPECIMEN DESCRIPTION: NORMAL
SPECIMEN DESCRIPTION: NORMAL
V CHOL+PARA RFBL+TRKH+TNAA STL QL NAA+PR: NORMAL
Y ENTERO RECN STL QL NAA+PROBE: NORMAL

## 2024-11-12 ENCOUNTER — HOSPITAL ENCOUNTER (OUTPATIENT)
Age: 74
Setting detail: SPECIMEN
Discharge: HOME | End: 2024-11-12

## 2024-11-13 LAB — SURGICAL PATHOLOGY REPORT: NORMAL

## 2024-11-14 ENCOUNTER — HOSPITAL ENCOUNTER (OUTPATIENT)
Age: 74
Setting detail: SPECIMEN
Discharge: HOME | End: 2024-11-14

## 2024-11-14 LAB
BACTERIA URNS QL MICRO: NORMAL
BILIRUB UR QL STRIP: NEGATIVE
CASTS #/AREA URNS LPF: NORMAL /LPF (ref 0–8)
CLARITY UR: CLEAR
COLOR UR: YELLOW
EPI CELLS #/AREA URNS HPF: NORMAL /HPF (ref 0–5)
GLUCOSE UR STRIP-MCNC: NEGATIVE MG/DL
HGB UR QL STRIP.AUTO: NEGATIVE
KETONES UR STRIP-MCNC: NEGATIVE MG/DL
LEUKOCYTE ESTERASE UR QL STRIP: NEGATIVE
NITRITE UR QL STRIP: NEGATIVE
PH UR STRIP: 6 [PH] (ref 5–8)
PROT UR STRIP-MCNC: ABNORMAL MG/DL
RBC #/AREA URNS HPF: NORMAL /HPF (ref 0–4)
SP GR UR STRIP: 1.03 (ref 1–1.03)
UROBILINOGEN UR STRIP-ACNC: NORMAL EU/DL (ref 0–1)
WBC #/AREA URNS HPF: NORMAL /HPF (ref 0–5)

## 2024-11-15 LAB
MICROORGANISM SPEC CULT: NORMAL
SPECIMEN DESCRIPTION: NORMAL

## 2025-01-13 ENCOUNTER — HOSPITAL ENCOUNTER (OUTPATIENT)
Dept: VASCULAR LAB | Age: 75
Discharge: HOME | End: 2025-01-15
Attending: FAMILY MEDICINE
Payer: MEDICARE

## 2025-01-13 ENCOUNTER — HOSPITAL ENCOUNTER (OUTPATIENT)
Age: 75
Discharge: HOME | End: 2025-01-13
Attending: FAMILY MEDICINE
Payer: MEDICARE

## 2025-01-13 DIAGNOSIS — M79.661 PAIN OF RIGHT LOWER LEG: ICD-10-CM

## 2025-01-13 DIAGNOSIS — M79.662 PAIN OF LEFT LOWER LEG: ICD-10-CM

## 2025-01-13 LAB
ANION GAP SERPL CALCULATED.3IONS-SCNC: 16 MMOL/L (ref 9–16)
BUN SERPL-MCNC: 26 MG/DL (ref 8–23)
CALCIUM SERPL-MCNC: 10.2 MG/DL (ref 8.6–10.4)
CHLORIDE SERPL-SCNC: 101 MMOL/L (ref 98–107)
CO2 SERPL-SCNC: 23 MMOL/L (ref 20–31)
CREAT SERPL-MCNC: 0.9 MG/DL (ref 0.6–0.9)
CREAT UR-MCNC: 181 MG/DL (ref 28–217)
D DIMER PPP FEU-MCNC: 0.6 UG/ML FEU (ref 0–0.59)
ERYTHROCYTE [DISTWIDTH] IN BLOOD BY AUTOMATED COUNT: 14.6 % (ref 11.8–14.4)
EST. AVERAGE GLUCOSE BLD GHB EST-MCNC: 137 MG/DL
GFR, ESTIMATED: 67 ML/MIN/1.73M2
GLUCOSE SERPL-MCNC: 143 MG/DL (ref 74–99)
HBA1C MFR BLD: 6.4 % (ref 4–6)
HCT VFR BLD AUTO: 43.8 % (ref 36.3–47.1)
HGB BLD-MCNC: 14.2 G/DL (ref 11.9–15.1)
MCH RBC QN AUTO: 31.3 PG (ref 25.2–33.5)
MCHC RBC AUTO-ENTMCNC: 32.4 G/DL (ref 28.4–34.8)
MCV RBC AUTO: 96.5 FL (ref 82.6–102.9)
MICROALBUMIN UR-MCNC: 205 MG/L (ref 0–20)
MICROALBUMIN/CREAT UR-RTO: 113 MCG/MG CREAT (ref 0–25)
NRBC BLD-RTO: 0.1 PER 100 WBC
PLATELET # BLD AUTO: 379 K/UL (ref 138–453)
PMV BLD AUTO: 9.9 FL (ref 8.1–13.5)
POTASSIUM SERPL-SCNC: 3.9 MMOL/L (ref 3.7–5.3)
RBC # BLD AUTO: 4.54 M/UL (ref 3.95–5.11)
SODIUM SERPL-SCNC: 140 MMOL/L (ref 136–145)
TSH SERPL DL<=0.05 MIU/L-ACNC: 1.78 UIU/ML (ref 0.27–4.2)
WBC OTHER # BLD: 13.6 K/UL (ref 3.5–11.3)

## 2025-01-13 PROCEDURE — 85379 FIBRIN DEGRADATION QUANT: CPT

## 2025-01-13 PROCEDURE — 83036 HEMOGLOBIN GLYCOSYLATED A1C: CPT

## 2025-01-13 PROCEDURE — 36415 COLL VENOUS BLD VENIPUNCTURE: CPT

## 2025-01-13 PROCEDURE — 85027 COMPLETE CBC AUTOMATED: CPT

## 2025-01-13 PROCEDURE — 84443 ASSAY THYROID STIM HORMONE: CPT

## 2025-01-13 PROCEDURE — 80048 BASIC METABOLIC PNL TOTAL CA: CPT

## 2025-01-13 PROCEDURE — 93970 EXTREMITY STUDY: CPT

## 2025-01-13 PROCEDURE — 82570 ASSAY OF URINE CREATININE: CPT

## 2025-01-13 PROCEDURE — 82043 UR ALBUMIN QUANTITATIVE: CPT

## 2025-01-30 ENCOUNTER — HOSPITAL ENCOUNTER (OUTPATIENT)
Dept: GENERAL RADIOLOGY | Age: 75
Discharge: HOME | End: 2025-02-01
Payer: MEDICARE

## 2025-01-30 ENCOUNTER — HOSPITAL ENCOUNTER (OUTPATIENT)
Age: 75
Discharge: HOME | End: 2025-02-01
Payer: MEDICARE

## 2025-01-30 DIAGNOSIS — J20.9 OBSTRUCTIVE CHRONIC BRONCHITIS WITH ACUTE BRONCHITIS (HCC): ICD-10-CM

## 2025-01-30 DIAGNOSIS — J44.0 OBSTRUCTIVE CHRONIC BRONCHITIS WITH ACUTE BRONCHITIS (HCC): ICD-10-CM

## 2025-01-30 PROCEDURE — 71046 X-RAY EXAM CHEST 2 VIEWS: CPT

## 2025-02-05 ENCOUNTER — TRANSCRIBE ORDERS (OUTPATIENT)
Dept: ADMINISTRATIVE | Age: 75
End: 2025-02-05

## 2025-02-05 DIAGNOSIS — A31.9: Primary | ICD-10-CM

## 2025-03-10 ENCOUNTER — TRANSCRIBE ORDERS (OUTPATIENT)
Dept: ADMINISTRATIVE | Age: 75
End: 2025-03-10

## 2025-03-10 ENCOUNTER — HOSPITAL ENCOUNTER (OUTPATIENT)
Dept: CT IMAGING | Age: 75
Discharge: HOME | End: 2025-03-12
Payer: MEDICARE

## 2025-03-10 ENCOUNTER — HOSPITAL ENCOUNTER (OUTPATIENT)
Age: 75
Setting detail: SPECIMEN
Discharge: HOME | End: 2025-03-10

## 2025-03-10 DIAGNOSIS — R10.9 ACUTE ABDOMINAL PAIN: Primary | ICD-10-CM

## 2025-03-10 DIAGNOSIS — R10.9 ACUTE ABDOMINAL PAIN: ICD-10-CM

## 2025-03-10 LAB
ALBUMIN SERPL-MCNC: 4.1 G/DL (ref 3.5–5.2)
ALBUMIN/GLOB SERPL: 1.1 {RATIO} (ref 1–2.5)
ALP SERPL-CCNC: 76 U/L (ref 35–104)
ALT SERPL-CCNC: 40 U/L (ref 10–35)
ANION GAP SERPL CALCULATED.3IONS-SCNC: 17 MMOL/L (ref 9–16)
AST SERPL-CCNC: 44 U/L (ref 10–35)
BILIRUB SERPL-MCNC: 0.3 MG/DL (ref 0–1.2)
BUN SERPL-MCNC: 10 MG/DL (ref 8–23)
CALCIUM SERPL-MCNC: 10.2 MG/DL (ref 8.6–10.4)
CHLORIDE SERPL-SCNC: 102 MMOL/L (ref 98–107)
CO2 SERPL-SCNC: 24 MMOL/L (ref 20–31)
CREAT SERPL-MCNC: 0.8 MG/DL (ref 0.6–0.9)
ERYTHROCYTE [DISTWIDTH] IN BLOOD BY AUTOMATED COUNT: 16.1 % (ref 11.8–14.4)
GFR, ESTIMATED: 77 ML/MIN/1.73M2
GLUCOSE SERPL-MCNC: 94 MG/DL (ref 74–99)
HCT VFR BLD AUTO: 42.9 % (ref 36.3–47.1)
HGB BLD-MCNC: 13.1 G/DL (ref 11.9–15.1)
LIPASE SERPL-CCNC: 38 U/L (ref 13–60)
MCH RBC QN AUTO: 30.9 PG (ref 25.2–33.5)
MCHC RBC AUTO-ENTMCNC: 30.5 G/DL (ref 28.4–34.8)
MCV RBC AUTO: 101.2 FL (ref 82.6–102.9)
NRBC BLD-RTO: 0 PER 100 WBC
PLATELET # BLD AUTO: 306 K/UL (ref 138–453)
PMV BLD AUTO: 11 FL (ref 8.1–13.5)
POTASSIUM SERPL-SCNC: 3.4 MMOL/L (ref 3.7–5.3)
PROT SERPL-MCNC: 7.8 G/DL (ref 6.6–8.7)
RBC # BLD AUTO: 4.24 M/UL (ref 3.95–5.11)
SODIUM SERPL-SCNC: 143 MMOL/L (ref 136–145)
WBC OTHER # BLD: 8.9 K/UL (ref 3.5–11.3)

## 2025-03-10 PROCEDURE — 2580000003 HC RX 258: Performed by: FAMILY MEDICINE

## 2025-03-10 PROCEDURE — 74177 CT ABD & PELVIS W/CONTRAST: CPT

## 2025-03-10 PROCEDURE — 2500000003 HC RX 250 WO HCPCS: Performed by: FAMILY MEDICINE

## 2025-03-10 PROCEDURE — 6360000004 HC RX CONTRAST MEDICATION: Performed by: FAMILY MEDICINE

## 2025-03-10 RX ORDER — SODIUM CHLORIDE 0.9 % (FLUSH) 0.9 %
10 SYRINGE (ML) INJECTION PRN
Status: DISCONTINUED | OUTPATIENT
Start: 2025-03-10 | End: 2025-03-13 | Stop reason: HOSPADM

## 2025-03-10 RX ORDER — 0.9 % SODIUM CHLORIDE 0.9 %
100 INTRAVENOUS SOLUTION INTRAVENOUS ONCE
Status: COMPLETED | OUTPATIENT
Start: 2025-03-10 | End: 2025-03-10

## 2025-03-10 RX ORDER — IOPAMIDOL 755 MG/ML
75 INJECTION, SOLUTION INTRAVASCULAR
Status: COMPLETED | OUTPATIENT
Start: 2025-03-10 | End: 2025-03-10

## 2025-03-10 RX ADMIN — SODIUM CHLORIDE 100 ML: 9 INJECTION, SOLUTION INTRAVENOUS at 14:55

## 2025-03-10 RX ADMIN — IOPAMIDOL 75 ML: 755 INJECTION, SOLUTION INTRAVENOUS at 14:54

## 2025-03-10 RX ADMIN — SODIUM CHLORIDE, PRESERVATIVE FREE 10 ML: 5 INJECTION INTRAVENOUS at 14:55

## 2025-03-12 ENCOUNTER — TRANSCRIBE ORDERS (OUTPATIENT)
Dept: ADMINISTRATIVE | Age: 75
End: 2025-03-12

## 2025-03-12 DIAGNOSIS — R10.9 ACUTE ABDOMINAL PAIN: Primary | ICD-10-CM

## 2025-03-17 ENCOUNTER — HOSPITAL ENCOUNTER (OUTPATIENT)
Age: 75
Setting detail: SPECIMEN
Discharge: HOME | End: 2025-03-17

## 2025-03-17 LAB
BACTERIA URNS QL MICRO: NORMAL
BILIRUB UR QL STRIP: NEGATIVE
CASTS #/AREA URNS LPF: NORMAL /LPF (ref 0–8)
CLARITY UR: CLEAR
COLOR UR: YELLOW
EPI CELLS #/AREA URNS HPF: NORMAL /HPF (ref 0–5)
GLUCOSE UR STRIP-MCNC: NEGATIVE MG/DL
H PYLORI BREATH TEST: NEGATIVE
HGB UR QL STRIP.AUTO: NEGATIVE
KETONES UR STRIP-MCNC: NEGATIVE MG/DL
LEUKOCYTE ESTERASE UR QL STRIP: NEGATIVE
NITRITE UR QL STRIP: NEGATIVE
PH UR STRIP: 6 [PH] (ref 5–8)
PROT UR STRIP-MCNC: ABNORMAL MG/DL
RBC #/AREA URNS HPF: NORMAL /HPF (ref 0–4)
SP GR UR STRIP: 1.01 (ref 1–1.03)
UROBILINOGEN UR STRIP-ACNC: NORMAL EU/DL (ref 0–1)
WBC #/AREA URNS HPF: NORMAL /HPF (ref 0–5)

## 2025-03-18 LAB
MICROORGANISM SPEC CULT: NORMAL
SPECIMEN DESCRIPTION: NORMAL

## 2025-03-26 ENCOUNTER — HOSPITAL ENCOUNTER (OUTPATIENT)
Age: 75
Setting detail: SPECIMEN
Discharge: HOME | End: 2025-03-26

## 2025-03-27 LAB
C DIFF GDH + TOXINS A+B STL QL IA.RAPID: NEGATIVE
CAMPYLOBACTER DNA SPEC NAA+PROBE: NORMAL
ETEC ELTA+ESTB GENES STL QL NAA+PROBE: NORMAL
P SHIGELLOIDES DNA STL QL NAA+PROBE: NORMAL
SALMONELLA DNA SPEC QL NAA+PROBE: NORMAL
SHIGA TOXIN STX GENE SPEC NAA+PROBE: NORMAL
SHIGELLA DNA SPEC QL NAA+PROBE: NORMAL
SPECIMEN DESCRIPTION: NORMAL
SPECIMEN DESCRIPTION: NORMAL
V CHOL+PARA RFBL+TRKH+TNAA STL QL NAA+PR: NORMAL
Y ENTERO RECN STL QL NAA+PROBE: NORMAL

## 2025-04-05 ENCOUNTER — HOSPITAL ENCOUNTER (OUTPATIENT)
Age: 75
Setting detail: SPECIMEN
Discharge: HOME | End: 2025-04-05

## 2025-04-05 LAB
25(OH)D3 SERPL-MCNC: 27.2 NG/ML (ref 30–100)
BACTERIA URNS QL MICRO: NORMAL
BILIRUB UR QL STRIP: NEGATIVE
CASTS #/AREA URNS LPF: NORMAL /LPF (ref 0–2)
CLARITY UR: CLEAR
COLOR UR: YELLOW
CREAT UR-MCNC: 236 MG/DL (ref 28–217)
EPI CELLS #/AREA URNS HPF: NORMAL /HPF (ref 0–5)
ERYTHROCYTE [DISTWIDTH] IN BLOOD BY AUTOMATED COUNT: 15.9 % (ref 11.8–14.4)
EST. AVERAGE GLUCOSE BLD GHB EST-MCNC: 137 MG/DL
GLUCOSE UR STRIP-MCNC: NEGATIVE MG/DL
HBA1C MFR BLD: 6.4 % (ref 4–6)
HCT VFR BLD AUTO: 43.4 % (ref 36.3–47.1)
HGB BLD-MCNC: 13 G/DL (ref 11.9–15.1)
HGB UR QL STRIP.AUTO: NEGATIVE
KETONES UR STRIP-MCNC: ABNORMAL MG/DL
LEUKOCYTE ESTERASE UR QL STRIP: ABNORMAL
MCH RBC QN AUTO: 30.9 PG (ref 25.2–33.5)
MCHC RBC AUTO-ENTMCNC: 30 G/DL (ref 28.4–34.8)
MCV RBC AUTO: 103.1 FL (ref 82.6–102.9)
MICROALBUMIN UR-MCNC: 166 MG/L (ref 0–20)
MICROALBUMIN/CREAT UR-RTO: 70 MCG/MG CREAT (ref 0–25)
NITRITE UR QL STRIP: NEGATIVE
NRBC BLD-RTO: 0 PER 100 WBC
PH UR STRIP: 5.5 [PH] (ref 5–8)
PLATELET # BLD AUTO: 369 K/UL (ref 138–453)
PMV BLD AUTO: 10.5 FL (ref 8.1–13.5)
PROT UR STRIP-MCNC: ABNORMAL MG/DL
RBC # BLD AUTO: 4.21 M/UL (ref 3.95–5.11)
RBC #/AREA URNS HPF: NORMAL /HPF (ref 0–2)
SP GR UR STRIP: 1.03 (ref 1–1.03)
UROBILINOGEN UR STRIP-ACNC: NORMAL EU/DL (ref 0–1)
VIT B12 SERPL-MCNC: 400 PG/ML (ref 232–1245)
WBC #/AREA URNS HPF: NORMAL /HPF (ref 0–5)
WBC OTHER # BLD: 9.1 K/UL (ref 3.5–11.3)

## 2025-04-06 LAB
MICROORGANISM SPEC CULT: NORMAL
SPECIMEN DESCRIPTION: NORMAL

## 2025-04-29 ENCOUNTER — HOSPITAL ENCOUNTER (OUTPATIENT)
Age: 75
Setting detail: SPECIMEN
Discharge: HOME | End: 2025-04-29

## 2025-04-29 LAB
BACTERIA URNS QL MICRO: NORMAL
BILIRUB UR QL STRIP: NEGATIVE
CASTS #/AREA URNS LPF: NORMAL /LPF (ref 0–8)
CLARITY UR: CLEAR
COLOR UR: YELLOW
EPI CELLS #/AREA URNS HPF: NORMAL /HPF (ref 0–5)
GLUCOSE UR STRIP-MCNC: NEGATIVE MG/DL
HGB UR QL STRIP.AUTO: NEGATIVE
KETONES UR STRIP-MCNC: ABNORMAL MG/DL
LEUKOCYTE ESTERASE UR QL STRIP: NEGATIVE
NITRITE UR QL STRIP: NEGATIVE
PH UR STRIP: 5.5 [PH] (ref 5–8)
PROT UR STRIP-MCNC: ABNORMAL MG/DL
RBC #/AREA URNS HPF: NORMAL /HPF (ref 0–4)
SP GR UR STRIP: 1.02 (ref 1–1.03)
UROBILINOGEN UR STRIP-ACNC: NORMAL EU/DL (ref 0–1)
WBC #/AREA URNS HPF: NORMAL /HPF (ref 0–5)

## 2025-04-30 LAB
MICROORGANISM SPEC CULT: NORMAL
SPECIMEN DESCRIPTION: NORMAL

## 2025-05-20 ENCOUNTER — ANESTHESIA (OUTPATIENT)
Dept: OPERATING ROOM | Age: 75
End: 2025-05-20
Payer: MEDICARE

## 2025-05-20 ENCOUNTER — ANESTHESIA EVENT (OUTPATIENT)
Dept: OPERATING ROOM | Age: 75
End: 2025-05-20
Payer: MEDICARE

## 2025-05-20 ENCOUNTER — HOSPITAL ENCOUNTER (OUTPATIENT)
Age: 75
Setting detail: OUTPATIENT SURGERY
Discharge: HOME | End: 2025-05-20
Attending: INTERNAL MEDICINE | Admitting: INTERNAL MEDICINE
Payer: MEDICARE

## 2025-05-20 VITALS
BODY MASS INDEX: 45.99 KG/M2 | OXYGEN SATURATION: 98 % | HEART RATE: 70 BPM | RESPIRATION RATE: 19 BRPM | DIASTOLIC BLOOD PRESSURE: 79 MMHG | WEIGHT: 293 LBS | SYSTOLIC BLOOD PRESSURE: 122 MMHG | HEIGHT: 67 IN | TEMPERATURE: 97.3 F

## 2025-05-20 DIAGNOSIS — R10.11 RUQ PAIN: ICD-10-CM

## 2025-05-20 DIAGNOSIS — R19.4 CHANGE IN BOWEL HABITS: ICD-10-CM

## 2025-05-20 LAB — GLUCOSE BLD-MCNC: 117 MG/DL (ref 65–105)

## 2025-05-20 PROCEDURE — 3700000001 HC ADD 15 MINUTES (ANESTHESIA): Performed by: INTERNAL MEDICINE

## 2025-05-20 PROCEDURE — 7100000011 HC PHASE II RECOVERY - ADDTL 15 MIN: Performed by: INTERNAL MEDICINE

## 2025-05-20 PROCEDURE — 3700000000 HC ANESTHESIA ATTENDED CARE: Performed by: INTERNAL MEDICINE

## 2025-05-20 PROCEDURE — 82947 ASSAY GLUCOSE BLOOD QUANT: CPT

## 2025-05-20 PROCEDURE — 3609012400 HC EGD TRANSORAL BIOPSY SINGLE/MULTIPLE: Performed by: INTERNAL MEDICINE

## 2025-05-20 PROCEDURE — 2709999900 HC NON-CHARGEABLE SUPPLY: Performed by: INTERNAL MEDICINE

## 2025-05-20 PROCEDURE — 7100000010 HC PHASE II RECOVERY - FIRST 15 MIN: Performed by: INTERNAL MEDICINE

## 2025-05-20 PROCEDURE — 6360000002 HC RX W HCPCS: Performed by: SPECIALIST

## 2025-05-20 PROCEDURE — 2580000003 HC RX 258: Performed by: ANESTHESIOLOGY

## 2025-05-20 PROCEDURE — 88305 TISSUE EXAM BY PATHOLOGIST: CPT

## 2025-05-20 PROCEDURE — 3609010300 HC COLONOSCOPY W/BIOPSY SINGLE/MULTIPLE: Performed by: INTERNAL MEDICINE

## 2025-05-20 RX ORDER — SODIUM CHLORIDE, SODIUM LACTATE, POTASSIUM CHLORIDE, CALCIUM CHLORIDE 600; 310; 30; 20 MG/100ML; MG/100ML; MG/100ML; MG/100ML
INJECTION, SOLUTION INTRAVENOUS CONTINUOUS
Status: DISCONTINUED | OUTPATIENT
Start: 2025-05-20 | End: 2025-05-20 | Stop reason: HOSPADM

## 2025-05-20 RX ORDER — LIDOCAINE HYDROCHLORIDE 20 MG/ML
INJECTION, SOLUTION EPIDURAL; INFILTRATION; INTRACAUDAL; PERINEURAL
Status: DISCONTINUED | OUTPATIENT
Start: 2025-05-20 | End: 2025-05-20 | Stop reason: SDUPTHER

## 2025-05-20 RX ORDER — SODIUM CHLORIDE 9 MG/ML
INJECTION, SOLUTION INTRAVENOUS CONTINUOUS
Status: DISCONTINUED | OUTPATIENT
Start: 2025-05-20 | End: 2025-05-20 | Stop reason: HOSPADM

## 2025-05-20 RX ORDER — ROSUVASTATIN CALCIUM 5 MG/1
5 TABLET, COATED ORAL DAILY
COMMUNITY

## 2025-05-20 RX ORDER — PROPOFOL 10 MG/ML
INJECTION, EMULSION INTRAVENOUS
Status: DISCONTINUED | OUTPATIENT
Start: 2025-05-20 | End: 2025-05-20 | Stop reason: SDUPTHER

## 2025-05-20 RX ADMIN — LIDOCAINE HYDROCHLORIDE 100 MG: 20 INJECTION, SOLUTION EPIDURAL; INFILTRATION; INTRACAUDAL; PERINEURAL at 10:03

## 2025-05-20 RX ADMIN — PROPOFOL 50 MG: 10 INJECTION, EMULSION INTRAVENOUS at 10:10

## 2025-05-20 RX ADMIN — PROPOFOL 50 MG: 10 INJECTION, EMULSION INTRAVENOUS at 10:06

## 2025-05-20 RX ADMIN — PROPOFOL 50 MG: 10 INJECTION, EMULSION INTRAVENOUS at 10:11

## 2025-05-20 RX ADMIN — PROPOFOL 50 MG: 10 INJECTION, EMULSION INTRAVENOUS at 10:18

## 2025-05-20 RX ADMIN — PROPOFOL 50 MG: 10 INJECTION, EMULSION INTRAVENOUS at 10:22

## 2025-05-20 RX ADMIN — PROPOFOL 50 MG: 10 INJECTION, EMULSION INTRAVENOUS at 10:16

## 2025-05-20 RX ADMIN — PROPOFOL 50 MG: 10 INJECTION, EMULSION INTRAVENOUS at 10:20

## 2025-05-20 RX ADMIN — PROPOFOL 150 MG: 10 INJECTION, EMULSION INTRAVENOUS at 10:03

## 2025-05-20 RX ADMIN — SODIUM CHLORIDE, SODIUM LACTATE, POTASSIUM CHLORIDE, AND CALCIUM CHLORIDE: .6; .31; .03; .02 INJECTION, SOLUTION INTRAVENOUS at 09:14

## 2025-05-20 RX ADMIN — PROPOFOL 50 MG: 10 INJECTION, EMULSION INTRAVENOUS at 10:13

## 2025-05-20 ASSESSMENT — PAIN - FUNCTIONAL ASSESSMENT
PAIN_FUNCTIONAL_ASSESSMENT: PREVENTS OR INTERFERES SOME ACTIVE ACTIVITIES AND ADLS
PAIN_FUNCTIONAL_ASSESSMENT: 0-10
PAIN_FUNCTIONAL_ASSESSMENT: NONE - DENIES PAIN

## 2025-05-20 NOTE — DISCHARGE INSTRUCTIONS
POST-ENDOSCOPY INSTRUCTIONS:    1. ACTIVITY   No driving, operating machinery, or making important decisions for 24 hours.    Resume normal activity after 24 hours.  You may return to work after 24 hours.    2. DIET     __x__ (EGD/ERCP):  Do not eat or drink for one hour after your exam.  You may then   try a sip of water, and if you are able to swallow as usual you may advance to a   regular diet.     __x__ (Colonscopy/Flex Sig):  Resume your usual diet unless specified below.       ____ Diet Modification:    3. MEDICATIONS (Do not consume alcohol, tranquilizers, or sleeping medications for 24           hours unless advised by your physician)       ___x__ Resume your usual medications    4. PHYSICIAN FOLLOW-UP        __x__ Please call the office for an appointment/further instructions.          ____ See your family physician.    5. ADDITIONAL INSTRUCTIONS          6. NORMAL CHANGES YOU MAY EXPERIENCE AFTER ENDOSCOPY:          EGD/ERCP     COLONOSCOPY      Sore throat after EGD/ERCP   Passing of gas for several hours after      A bloated feeling and belching from  Some mild abdominal cramping   air in stomach    If a biopsy/polypectomy was done, you      If a biopsy was done, you may spit   may see some spotting of blood   up some blood tinged mucous  You may feel fatigued for the next 24-48         hours due to the prep and sedation    7. CALL YOUR PHYSICIAN IF YOU EXPERIENCE ANY OF THE FOLLOWING:      A.  Passing blood rectally or vomiting blood (color may be red or black)      B.  Severe abdominal pain or tenderness (that is not relieved by passing air)      C.  Fever, chills, or excessive sweating      D.  Persistent nausea or vomiting      E.  Redness or swelling at the IV site

## 2025-05-20 NOTE — H&P
GI History and Physical    Pt Name: Etienne Thomas  MRN: [id number]  YOB: 1950  Date of evaluation: 5/20/2025  Primary Care Physician: Brooklyn Gomes MD    SUBJECTIVE:   History of Chief Complaint:    This is Etienne Thomas a 74 y.o. female who presents today for a COLONOSCOPY AND EGD  by Dr ZAPIEN  for EVALUATION OF RIGHT UPPER QUADRANT PAIN..   The patient completed the prep as directed Yes. Bowel movements have not significantly changed The patient does have a family history of colon cancer or polyps. Previous colonoscopy No.  .   Denies history of ulcers, hiatal hernia, acid reflux/GERD, or IBS. Previous EGD: Yes.  2018 .  Biopsies were taken.       Patient today denies  fever, chills, night sweats, pain or unexplained weight loss. SHE HAS ASTHMA ,USES XOPENEX PRN.SHE DOES NOT TAKE BLOOD THINNERS.SHE DOES HAVE DIABETES.BLOOD SUGAR .    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  Past Surgical History   has a past surgical history that includes Tonsillectomy; Tubal ligation; Hysterectomy; joint replacement (Bilateral); ablation of dysrhythmic focus; Cholecystectomy; sinus surgery; Total knee arthroplasty (Bilateral); Urethra dilation; pr egd transoral biopsy single/multiple (N/A, 07/10/2018); skin biopsy; Abdominal exploration surgery; Appendectomy; laparoscopy; Rotator cuff repair (Left); bronchoscopy; Temporal Artery Biopsy; laminectomy; and Hand

## 2025-05-20 NOTE — OP NOTE
rectum and advanced with difficulty  to the sigmoid colon.   The prep was excellent.  Examination of the mucosa was performed during both introduction and withdrawal of the colonoscope.     Findings:   1.  Restricted mobility with diverticulosis and looping not allowing advancement beyond sigmoid colon despite multiple maneuvers.  Random biopsies taken  2.  Diverticulosis  3.  Internal hemorrhoids    With the patient in the left lateral decubitus position, the Olympus videoendoscope was placed in the patient's mouth and under direct visualization passed into the esophagus.  Visualization of the esophagus, stomach, and duodenum was performed during both introduction and withdrawal of the endoscope and retroflexed view of the proximal stomach was obtained. The scope was passed to the second portion of the duodenum.  The patient tolerated the procedure well and was taken to the recovery area in good condition.    Findings::   Esophagus: Normal.   Stomach: Normal.  Biopsied  Duodenum: Normal.  Biopsied       Recommendations: Outpatient CT colonography to evaluate remaining colon.  Review path when available.  Follow-up in office as previously arranged      Electronically signed by Jerel Georges DO on 5/20/2025 at 10:27 AM

## 2025-05-20 NOTE — ANESTHESIA POSTPROCEDURE EVALUATION
Department of Anesthesiology  Postprocedure Note    Patient: Etienne Thomas  MRN: [id number]  YOB: 1950  Date of evaluation: 5/20/2025    Procedure Summary       Date: 05/20/25 Room / Location: [address]    Anesthesia Start: 0958 Anesthesia Stop: 1033    Procedures:       incomplete COLONOSCOPY with  BIOPSY      ESOPHAGOGASTRODUODENOSCOPY BIOPSY Diagnosis:       RUQ pain      Change in bowel habits      (RUQ pain [R10.11])      (Change in bowel habits [R19.4])    Surgeons: Jerel Georges DO Responsible Provider: Sarath Roblero MD    Anesthesia Type: MAC ASA Status: 3            Anesthesia Type: No value filed.    Alicia Phase I:      Alicia Phase II: Alicia Score: 10    Anesthesia Post Evaluation    Patient location during evaluation: PACU  Patient participation: complete - patient participated  Level of consciousness: awake and alert  Airway patency: patent  Nausea & Vomiting: no nausea and no vomiting  Cardiovascular status: hemodynamically stable  Respiratory status: acceptable  Hydration status: euvolemic  Pain management: adequate    No notable events documented.

## 2025-05-20 NOTE — ANESTHESIA PRE PROCEDURE
Department of Anesthesiology  Preprocedure Note       Name:  Etienne Thomas   Age:  74 y.o.  :  1950                                          MRN:  [id number]         Date:  2025      Surgeon: Surgeon(s):  Jerel Georges DO    Procedure: Procedure(s):  EGD    Medications prior to admission:   Prior to Admission medications    Medication Sig Start Date End Date Taking? Authorizing Provider   rosuvastatin (CRESTOR) 5 MG tablet Take 1 tablet by mouth daily   Yes ProviderHakan MD   valACYclovir (VALTREX) 500 MG tablet Take 1 tablet by mouth 2 times daily   Yes Provider, MD Hakan   fluconazole (DIFLUCAN) 150 MG tablet 1 tablet 18  Yes ProviderHakan MD   levothyroxine (SYNTHROID) 25 MCG tablet  20  Yes Provider, MD Hakan   metFORMIN (GLUCOPHAGE) 500 MG tablet 0.5 tablets  and friday   Yes ProviderHakan MD   Bacillus Coagulans-Inulin (PROBIOTIC) 1-250 BILLION-MG CAPS Probiotic   one daily   Yes ProviderHakan MD   guaiFENesin (MUCINEX) 600 MG extended release tablet Mucinex   one every night   Yes Provider, MD Hakan   acetaminophen (TYLENOL) 500 MG tablet Take 1 tablet by mouth every 6 hours as needed   Yes Provider, MD Hakan   levalbuterol (XOPENEX) 0.31 MG/3ML nebulization Take 3 mLs by nebulization every 4 hours as needed for Wheezing   Yes Provider, MD Hakan   azelastine (ASTELIN) 0.1 % nasal spray 1 spray by Nasal route 2 times daily Use in each nostril as directed   Yes Provider, MD Hakan   verapamil (CALAN SR) 180 MG extended release tablet Take 1 tablet by mouth nightly   Yes Provider, MD Hakan   mupirocin (BACTROBAN) 2 % ointment Apply topically 3 times daily Apply topically 3 times daily.   Yes ProviderHakan MD   levalbuterol (XOPENEX HFA) 45 MCG/ACT inhaler Inhale 1-2 puffs into the lungs every 4 hours as needed for Wheezing   Yes ProviderHakan MD   furosemide (LASIX) 20 MG tablet Take 1

## 2025-05-21 LAB — GLUCOSE BLD-MCNC: 117 MG/DL (ref 65–105)

## 2025-05-22 LAB — SURGICAL PATHOLOGY REPORT: NORMAL

## 2025-07-02 ENCOUNTER — TRANSCRIBE ORDERS (OUTPATIENT)
Dept: ADMINISTRATIVE | Age: 75
End: 2025-07-02

## 2025-07-02 DIAGNOSIS — R10.30 PAIN RADIATING TO LOWER ABDOMEN: ICD-10-CM

## 2025-07-02 DIAGNOSIS — R14.0 ABDOMINAL BLOATING: Primary | ICD-10-CM

## 2025-07-14 ENCOUNTER — HOSPITAL ENCOUNTER (OUTPATIENT)
Dept: NUCLEAR MEDICINE | Age: 75
Discharge: HOME | End: 2025-07-16
Attending: FAMILY MEDICINE
Payer: MEDICARE

## 2025-07-14 DIAGNOSIS — R14.0 ABDOMINAL BLOATING: ICD-10-CM

## 2025-07-14 DIAGNOSIS — R10.30 PAIN RADIATING TO LOWER ABDOMEN: ICD-10-CM

## 2025-07-14 PROCEDURE — 78264 GASTRIC EMPTYING IMG STUDY: CPT

## 2025-07-14 PROCEDURE — 3430000000 HC RX DIAGNOSTIC RADIOPHARMACEUTICAL: Performed by: FAMILY MEDICINE

## 2025-07-14 PROCEDURE — A9541 TC99M SULFUR COLLOID: HCPCS | Performed by: FAMILY MEDICINE

## 2025-07-14 RX ADMIN — Medication 2.8 MILLICURIE: at 09:34

## 2025-08-11 ENCOUNTER — TRANSCRIBE ORDERS (OUTPATIENT)
Dept: ADMINISTRATIVE | Age: 75
End: 2025-08-11

## 2025-08-11 DIAGNOSIS — R14.0 POSTPRANDIAL ABDOMINAL BLOATING: Primary | ICD-10-CM

## 2025-08-28 ENCOUNTER — HOSPITAL ENCOUNTER (OUTPATIENT)
Age: 75
Setting detail: SPECIMEN
Discharge: HOME | End: 2025-08-28

## 2025-08-28 ENCOUNTER — HOSPITAL ENCOUNTER (OUTPATIENT)
Dept: CT IMAGING | Age: 75
Discharge: HOME | End: 2025-08-30
Attending: FAMILY MEDICINE
Payer: MEDICARE

## 2025-08-28 DIAGNOSIS — R14.0 POSTPRANDIAL ABDOMINAL BLOATING: ICD-10-CM

## 2025-08-28 LAB
BILIRUB UR QL STRIP: NEGATIVE
CLARITY UR: CLEAR
COLOR UR: YELLOW
COMMENT: ABNORMAL
CREAT SERPL-MCNC: 0.8 MG/DL (ref 0.5–0.9)
GFR, ESTIMATED: 76 ML/MIN/1.73M2
GLUCOSE UR STRIP-MCNC: NEGATIVE MG/DL
HGB UR QL STRIP.AUTO: NEGATIVE
KETONES UR STRIP-MCNC: NEGATIVE MG/DL
LEUKOCYTE ESTERASE UR QL STRIP: NEGATIVE
NITRITE UR QL STRIP: NEGATIVE
PH UR STRIP: 6 [PH] (ref 5–8)
PROT UR STRIP-MCNC: NEGATIVE MG/DL
SP GR UR STRIP: 1 (ref 1–1.03)
UROBILINOGEN UR STRIP-ACNC: NORMAL EU/DL (ref 0–1)

## 2025-08-28 PROCEDURE — 2500000003 HC RX 250 WO HCPCS: Performed by: FAMILY MEDICINE

## 2025-08-28 PROCEDURE — 74177 CT ABD & PELVIS W/CONTRAST: CPT

## 2025-08-28 PROCEDURE — 82565 ASSAY OF CREATININE: CPT

## 2025-08-28 PROCEDURE — 36415 COLL VENOUS BLD VENIPUNCTURE: CPT

## 2025-08-28 PROCEDURE — 2580000003 HC RX 258: Performed by: FAMILY MEDICINE

## 2025-08-28 PROCEDURE — 6360000004 HC RX CONTRAST MEDICATION: Performed by: FAMILY MEDICINE

## 2025-08-28 RX ORDER — 0.9 % SODIUM CHLORIDE 0.9 %
80 INTRAVENOUS SOLUTION INTRAVENOUS ONCE
Status: COMPLETED | OUTPATIENT
Start: 2025-08-28 | End: 2025-08-28

## 2025-08-28 RX ORDER — IOPAMIDOL 755 MG/ML
75 INJECTION, SOLUTION INTRAVASCULAR
Status: COMPLETED | OUTPATIENT
Start: 2025-08-28 | End: 2025-08-28

## 2025-08-28 RX ORDER — SODIUM CHLORIDE 0.9 % (FLUSH) 0.9 %
10 SYRINGE (ML) INJECTION ONCE
Status: COMPLETED | OUTPATIENT
Start: 2025-08-28 | End: 2025-08-28

## 2025-08-28 RX ADMIN — BARIUM SULFATE 450 ML: 20 SUSPENSION ORAL at 14:52

## 2025-08-28 RX ADMIN — SODIUM CHLORIDE, PRESERVATIVE FREE 10 ML: 5 INJECTION INTRAVENOUS at 14:52

## 2025-08-28 RX ADMIN — IOPAMIDOL 75 ML: 755 INJECTION, SOLUTION INTRAVENOUS at 14:52

## 2025-08-28 RX ADMIN — SODIUM CHLORIDE 80 ML: 9 INJECTION, SOLUTION INTRAVENOUS at 14:52

## 2025-08-29 LAB
MICROORGANISM SPEC CULT: NO GROWTH
SPECIMEN DESCRIPTION: NORMAL

## (undated) DEVICE — STAZ ENDO KIT: Brand: MEDLINE INDUSTRIES, INC.

## (undated) DEVICE — FORCEPS BX L240CM WRK CHN 2.8MM STD CAP W/ NDL MIC MESH

## (undated) DEVICE — SINGLE-USE BIOPSY FORCEPS: Brand: RADIAL JAW 4

## (undated) DEVICE — BITE BLOCK ENDOSCP AD 60 FR W/ ADJ STRP PLAS GRN BLOX